# Patient Record
Sex: FEMALE | Race: OTHER | NOT HISPANIC OR LATINO
[De-identification: names, ages, dates, MRNs, and addresses within clinical notes are randomized per-mention and may not be internally consistent; named-entity substitution may affect disease eponyms.]

---

## 2024-04-01 ENCOUNTER — LABORATORY RESULT (OUTPATIENT)
Age: 74
End: 2024-04-01

## 2024-04-01 ENCOUNTER — TRANSCRIPTION ENCOUNTER (OUTPATIENT)
Age: 74
End: 2024-04-01

## 2024-04-01 ENCOUNTER — APPOINTMENT (OUTPATIENT)
Dept: INTERNAL MEDICINE | Facility: CLINIC | Age: 74
End: 2024-04-01
Payer: MEDICARE

## 2024-04-01 ENCOUNTER — NON-APPOINTMENT (OUTPATIENT)
Age: 74
End: 2024-04-01

## 2024-04-01 VITALS
BODY MASS INDEX: 24.07 KG/M2 | DIASTOLIC BLOOD PRESSURE: 88 MMHG | OXYGEN SATURATION: 96 % | SYSTOLIC BLOOD PRESSURE: 165 MMHG | HEART RATE: 66 BPM | HEIGHT: 64 IN | WEIGHT: 141 LBS | TEMPERATURE: 98.2 F

## 2024-04-01 VITALS — SYSTOLIC BLOOD PRESSURE: 152 MMHG | DIASTOLIC BLOOD PRESSURE: 84 MMHG

## 2024-04-01 DIAGNOSIS — Z00.00 ENCOUNTER FOR GENERAL ADULT MEDICAL EXAMINATION W/OUT ABNORMAL FINDINGS: ICD-10-CM

## 2024-04-01 DIAGNOSIS — E03.9 HYPOTHYROIDISM, UNSPECIFIED: ICD-10-CM

## 2024-04-01 DIAGNOSIS — G62.9 POLYNEUROPATHY, UNSPECIFIED: ICD-10-CM

## 2024-04-01 DIAGNOSIS — F17.210 NICOTINE DEPENDENCE, CIGARETTES, UNCOMPLICATED: ICD-10-CM

## 2024-04-01 PROCEDURE — 36415 COLL VENOUS BLD VENIPUNCTURE: CPT

## 2024-04-01 PROCEDURE — G0439: CPT

## 2024-04-01 PROCEDURE — 93000 ELECTROCARDIOGRAM COMPLETE: CPT | Mod: 59

## 2024-04-01 RX ORDER — LEVOTHYROXINE SODIUM 0.11 MG/1
112 TABLET ORAL
Qty: 180 | Refills: 1 | Status: ACTIVE | COMMUNITY
Start: 1900-01-01 | End: 1900-01-01

## 2024-04-01 RX ORDER — MEMANTINE HYDROCHLORIDE 10 MG/1
10 TABLET, FILM COATED ORAL
Refills: 0 | Status: ACTIVE | COMMUNITY

## 2024-04-01 RX ORDER — LOSARTAN POTASSIUM 50 MG/1
50 TABLET, FILM COATED ORAL
Refills: 0 | Status: ACTIVE | COMMUNITY

## 2024-04-01 NOTE — ASSESSMENT
[FreeTextEntry1] : Annual Wellness Visit -Repeat BP is slightly elevated, patient does state some stress given office visit. Advised to monitor at home.  -Check A1c, lipid panel and Vitamin levels -Numbness of feet: Neuro consult placed and metabolic panel ordered. -Continue with Healthy diet. -Pt declined Mammogram, Colonoscopy and Low dose CT scan (given smoking Hx). -RTO in 6 months.

## 2024-04-01 NOTE — ADDENDUM
[FreeTextEntry1] : I, Aleisha Alonzo, acted as a scribe on behalf of Dr. Raman Enrique MD, on 04/01/2024.   All medical entries made by the scribe were at my, Dr. Raman Enrique MD, direction and personally dictated by me on 04/01/2024. I have reviewed the chart and agree that the record accurately reflects my personal performance of the history, physical exam, assessment and plan. I have also personally directed, reviewed, and agreed with the chart.

## 2024-04-01 NOTE — HISTORY OF PRESENT ILLNESS
[FreeTextEntry1] : Patient presents to [facility] and Medicare Annual Wellness Visit. [de-identified] : A 72 y/o F pt presents to office with Hx of Hypothyroidism. Pt is doing well overall and has not gotten sick for the past year.  Pt c/o numbness of feet. Denies LE edema, weakness, CP, Chest tightness, SOB. Reports weight has been stable at 140s. Denies any abdominal pain, urinary symptom or change in bowel habits.  Denies any anxiety, depression or insomnia.  Pt is a current smoker.

## 2024-04-01 NOTE — PHYSICAL EXAM
[No Acute Distress] : no acute distress [Well Nourished] : well nourished [Well Developed] : well developed [Normal Sclera/Conjunctiva] : normal sclera/conjunctiva [Well-Appearing] : well-appearing [PERRL] : pupils equal round and reactive to light [EOMI] : extraocular movements intact [Normal Outer Ear/Nose] : the outer ears and nose were normal in appearance [Normal Oropharynx] : the oropharynx was normal [No Lymphadenopathy] : no lymphadenopathy [No JVD] : no jugular venous distention [Thyroid Normal, No Nodules] : the thyroid was normal and there were no nodules present [Supple] : supple [No Respiratory Distress] : no respiratory distress  [No Accessory Muscle Use] : no accessory muscle use [Clear to Auscultation] : lungs were clear to auscultation bilaterally [Normal Rate] : normal rate  [Normal S1, S2] : normal S1 and S2 [Regular Rhythm] : with a regular rhythm [No Murmur] : no murmur heard [No Carotid Bruits] : no carotid bruits [No Abdominal Bruit] : a ~M bruit was not heard ~T in the abdomen [No Varicosities] : no varicosities [No Edema] : there was no peripheral edema [Pedal Pulses Present] : the pedal pulses are present [No Palpable Aorta] : no palpable aorta [No Extremity Clubbing/Cyanosis] : no extremity clubbing/cyanosis [Non Tender] : non-tender [Soft] : abdomen soft [No Masses] : no abdominal mass palpated [Non-distended] : non-distended [Normal Bowel Sounds] : normal bowel sounds [No HSM] : no HSM [Normal Anterior Cervical Nodes] : no anterior cervical lymphadenopathy [Normal Posterior Cervical Nodes] : no posterior cervical lymphadenopathy [No Spinal Tenderness] : no spinal tenderness [No CVA Tenderness] : no CVA  tenderness [Grossly Normal Strength/Tone] : grossly normal strength/tone [No Joint Swelling] : no joint swelling [Coordination Grossly Intact] : coordination grossly intact [No Rash] : no rash [No Focal Deficits] : no focal deficits [Normal Gait] : normal gait [Deep Tendon Reflexes (DTR)] : deep tendon reflexes were 2+ and symmetric [Normal Affect] : the affect was normal [Normal Insight/Judgement] : insight and judgment were intact

## 2024-04-01 NOTE — HEALTH RISK ASSESSMENT
[Good] : ~his/her~  mood as  good [Yes] : Yes [No] : In the past 12 months have you used drugs other than those required for medical reasons? No [No falls in past year] : Patient reported no falls in the past year [Patient declined mammogram] : Patient declined mammogram [Patient declined colonoscopy] : Patient declined colonoscopy [Fully functional (bathing, dressing, toileting, transferring, walking, feeding)] : Fully functional (bathing, dressing, toileting, transferring, walking, feeding) [Feels Safe at Home] : Feels safe at home [Fully functional (using the telephone, shopping, preparing meals, housekeeping, doing laundry, using] : Fully functional and needs no help or supervision to perform IADLs (using the telephone, shopping, preparing meals, housekeeping, doing laundry, using transportation, managing medications and managing finances) [Reports normal functional visual acuity (ie: able to read med bottle)] : Reports normal functional visual acuity [Smoke Detector] : smoke detector [Carbon Monoxide Detector] : carbon monoxide detector [Seat Belt] :  uses seat belt [Safety elements used in home] : safety elements used in home [Sunscreen] : uses sunscreen [Current] : Current [20 or more] : 20 or more [FreeTextEntry1] : Health Maintenance [Change in mental status noted] : No change in mental status noted [Language] : denies difficulty with language [Learning/Retaining New Information] : denies difficulty learning/retaining new information [Behavior] : denies difficulty with behavior [Handling Complex Tasks] : denies difficulty handling complex tasks [Spatial Ability and Orientation] : denies difficulty with spatial ability and orientation [Reasoning] : denies difficulty with reasoning [Reports changes in hearing] : Reports no changes in hearing [Reports changes in vision] : Reports no changes in vision [Reports changes in dental health] : Reports no changes in dental health [Guns at Home] : no guns at home [Travel to Developing Areas] : does not  travel to developing areas [TB Exposure] : is not being exposed to tuberculosis

## 2024-04-08 ENCOUNTER — NON-APPOINTMENT (OUTPATIENT)
Age: 74
End: 2024-04-08

## 2024-04-08 LAB
25(OH)D3 SERPL-MCNC: 62.3 NG/ML
ALBUMIN MFR SERPL ELPH: 57.6 %
ALBUMIN SERPL ELPH-MCNC: 4.5 G/DL
ALBUMIN SERPL-MCNC: 4.1 G/DL
ALBUMIN/GLOB SERPL: 1.4 RATIO
ALBUPE: 25.6 %
ALP BLD-CCNC: 93 U/L
ALPHA1 GLOB MFR SERPL ELPH: 4.7 %
ALPHA1 GLOB SERPL ELPH-MCNC: 0.3 G/DL
ALPHA1UPE: 21.6 %
ALPHA2 GLOB MFR SERPL ELPH: 10.5 %
ALPHA2 GLOB SERPL ELPH-MCNC: 0.7 G/DL
ALPHA2UPE: 16.7 %
ALT SERPL-CCNC: 11 U/L
ANION GAP SERPL CALC-SCNC: 11 MMOL/L
APPEARANCE: CLEAR
AST SERPL-CCNC: 13 U/L
B-GLOBULIN MFR SERPL ELPH: 11.5 %
B-GLOBULIN SERPL ELPH-MCNC: 0.8 G/DL
BASOPHILS # BLD AUTO: 0.08 K/UL
BASOPHILS NFR BLD AUTO: 1 %
BETAUPE: 14 %
BILIRUB SERPL-MCNC: 0.4 MG/DL
BILIRUBIN URINE: NEGATIVE
BLOOD URINE: NEGATIVE
BUN SERPL-MCNC: 10 MG/DL
CALCIUM SERPL-MCNC: 9.9 MG/DL
CHLORIDE SERPL-SCNC: 105 MMOL/L
CHOLEST SERPL-MCNC: 181 MG/DL
CO2 SERPL-SCNC: 28 MMOL/L
COLOR: YELLOW
CREAT SERPL-MCNC: 0.65 MG/DL
EGFR: 92 ML/MIN/1.73M2
EOSINOPHIL # BLD AUTO: 0.1 K/UL
EOSINOPHIL NFR BLD AUTO: 1.2 %
ESTIMATED AVERAGE GLUCOSE: 105 MG/DL
FOLATE SERPL-MCNC: 5.6 NG/ML
GAMMA GLOB FLD ELPH-MCNC: 1.1 G/DL
GAMMA GLOB MFR SERPL ELPH: 15.7 %
GAMMAUPE: 22.1 %
GLUCOSE QUALITATIVE U: NEGATIVE MG/DL
GLUCOSE SERPL-MCNC: 96 MG/DL
HBA1C MFR BLD HPLC: 5.3 %
HCT VFR BLD CALC: 44.3 %
HDLC SERPL-MCNC: 44 MG/DL
HGB BLD-MCNC: 14.3 G/DL
IGA 24H UR QL IFE: NORMAL
IMM GRANULOCYTES NFR BLD AUTO: 0.4 %
INTERPRETATION SERPL IEP-IMP: NORMAL
KAPPA LC 24H UR QL: NORMAL
KETONES URINE: NEGATIVE MG/DL
LDLC SERPL CALC-MCNC: 112 MG/DL
LEUKOCYTE ESTERASE URINE: ABNORMAL
LYMPHOCYTES # BLD AUTO: 2.21 K/UL
LYMPHOCYTES NFR BLD AUTO: 27.5 %
M PROTEIN SPEC IFE-MCNC: NORMAL
MAN DIFF?: NORMAL
MCHC RBC-ENTMCNC: 29 PG
MCHC RBC-ENTMCNC: 32.3 GM/DL
MCV RBC AUTO: 89.9 FL
MONOCYTES # BLD AUTO: 0.68 K/UL
MONOCYTES NFR BLD AUTO: 8.5 %
NEUTROPHILS # BLD AUTO: 4.93 K/UL
NEUTROPHILS NFR BLD AUTO: 61.4 %
NITRITE URINE: NEGATIVE
NONHDLC SERPL-MCNC: 138 MG/DL
PH URINE: 7
PLATELET # BLD AUTO: 366 K/UL
POTASSIUM SERPL-SCNC: 4.7 MMOL/L
PROT PATTERN 24H UR ELPH-IMP: NORMAL
PROT SERPL-MCNC: 7.1 G/DL
PROT UR-MCNC: 5 MG/DL
PROT UR-MCNC: 5 MG/DL
PROTEIN URINE: NEGATIVE MG/DL
RBC # BLD: 4.93 M/UL
RBC # FLD: 15.1 %
SODIUM SERPL-SCNC: 144 MMOL/L
SPECIFIC GRAVITY URINE: 1.01
TRIGL SERPL-MCNC: 143 MG/DL
TSH SERPL-ACNC: 2.9 UIU/ML
UROBILINOGEN URINE: 0.2 MG/DL
VIT B12 SERPL-MCNC: 1241 PG/ML
VIT B6 SERPL-MCNC: 6.4 UG/L
WBC # FLD AUTO: 8.03 K/UL

## 2024-04-15 ENCOUNTER — NON-APPOINTMENT (OUTPATIENT)
Age: 74
End: 2024-04-15

## 2024-09-09 ENCOUNTER — INPATIENT (INPATIENT)
Facility: HOSPITAL | Age: 74
LOS: 7 days | Discharge: HOME | DRG: 392 | End: 2024-09-17
Attending: HOSPITALIST | Admitting: HOSPITALIST
Payer: MEDICARE

## 2024-09-09 ENCOUNTER — APPOINTMENT (OUTPATIENT)
Dept: INTERNAL MEDICINE | Facility: CLINIC | Age: 74
End: 2024-09-09
Payer: MEDICARE

## 2024-09-09 VITALS
DIASTOLIC BLOOD PRESSURE: 81 MMHG | OXYGEN SATURATION: 94 % | HEART RATE: 69 BPM | TEMPERATURE: 97.4 F | WEIGHT: 139 LBS | BODY MASS INDEX: 23.73 KG/M2 | SYSTOLIC BLOOD PRESSURE: 131 MMHG | HEIGHT: 64 IN

## 2024-09-09 VITALS
SYSTOLIC BLOOD PRESSURE: 127 MMHG | OXYGEN SATURATION: 96 % | RESPIRATION RATE: 20 BRPM | WEIGHT: 139.99 LBS | TEMPERATURE: 98 F | DIASTOLIC BLOOD PRESSURE: 66 MMHG | HEART RATE: 84 BPM | HEIGHT: 64 IN

## 2024-09-09 DIAGNOSIS — R10.9 UNSPECIFIED ABDOMINAL PAIN: ICD-10-CM

## 2024-09-09 LAB
ADD ON TEST-SPECIMEN IN LAB: SIGNIFICANT CHANGE UP
ALBUMIN SERPL ELPH-MCNC: 4.1 G/DL — SIGNIFICANT CHANGE UP (ref 3.3–5)
ALP SERPL-CCNC: 210 U/L — HIGH (ref 40–120)
ALT FLD-CCNC: 218 U/L — HIGH (ref 10–45)
ANION GAP SERPL CALC-SCNC: 13 MMOL/L — SIGNIFICANT CHANGE UP (ref 5–17)
APTT BLD: 30.2 SEC — SIGNIFICANT CHANGE UP (ref 24.5–35.6)
AST SERPL-CCNC: 47 U/L — HIGH (ref 10–40)
BASOPHILS # BLD AUTO: 0 K/UL — SIGNIFICANT CHANGE UP (ref 0–0.2)
BASOPHILS NFR BLD AUTO: 0 % — SIGNIFICANT CHANGE UP (ref 0–2)
BILIRUB SERPL-MCNC: 0.6 MG/DL — SIGNIFICANT CHANGE UP (ref 0.2–1.2)
BUN SERPL-MCNC: 11 MG/DL — SIGNIFICANT CHANGE UP (ref 7–23)
CALCIUM SERPL-MCNC: 9.8 MG/DL — SIGNIFICANT CHANGE UP (ref 8.4–10.5)
CHLORIDE SERPL-SCNC: 99 MMOL/L — SIGNIFICANT CHANGE UP (ref 96–108)
CO2 SERPL-SCNC: 24 MMOL/L — SIGNIFICANT CHANGE UP (ref 22–31)
CREAT SERPL-MCNC: 0.61 MG/DL — SIGNIFICANT CHANGE UP (ref 0.5–1.3)
EGFR: 93 ML/MIN/1.73M2 — SIGNIFICANT CHANGE UP
EOSINOPHIL # BLD AUTO: 0.18 K/UL — SIGNIFICANT CHANGE UP (ref 0–0.5)
EOSINOPHIL NFR BLD AUTO: 1.7 % — SIGNIFICANT CHANGE UP (ref 0–6)
GAS PNL BLDV: SIGNIFICANT CHANGE UP
GLUCOSE SERPL-MCNC: 106 MG/DL — HIGH (ref 70–99)
HCT VFR BLD CALC: 38.3 % — SIGNIFICANT CHANGE UP (ref 34.5–45)
HGB BLD-MCNC: 13.1 G/DL — SIGNIFICANT CHANGE UP (ref 11.5–15.5)
INR BLD: 0.98 RATIO — SIGNIFICANT CHANGE UP (ref 0.85–1.18)
LIDOCAIN IGE QN: 186 U/L — HIGH (ref 7–60)
LYMPHOCYTES # BLD AUTO: 1.73 K/UL — SIGNIFICANT CHANGE UP (ref 1–3.3)
LYMPHOCYTES # BLD AUTO: 16.4 % — SIGNIFICANT CHANGE UP (ref 13–44)
MANUAL SMEAR VERIFICATION: SIGNIFICANT CHANGE UP
MCHC RBC-ENTMCNC: 29 PG — SIGNIFICANT CHANGE UP (ref 27–34)
MCHC RBC-ENTMCNC: 34.2 GM/DL — SIGNIFICANT CHANGE UP (ref 32–36)
MCV RBC AUTO: 84.9 FL — SIGNIFICANT CHANGE UP (ref 80–100)
MONOCYTES # BLD AUTO: 1 K/UL — HIGH (ref 0–0.9)
MONOCYTES NFR BLD AUTO: 9.5 % — SIGNIFICANT CHANGE UP (ref 2–14)
NEUTROPHILS # BLD AUTO: 7.63 K/UL — HIGH (ref 1.8–7.4)
NEUTROPHILS NFR BLD AUTO: 72.4 % — SIGNIFICANT CHANGE UP (ref 43–77)
PLAT MORPH BLD: ABNORMAL
PLATELET # BLD AUTO: 283 K/UL — SIGNIFICANT CHANGE UP (ref 150–400)
POTASSIUM SERPL-MCNC: 3.3 MMOL/L — LOW (ref 3.5–5.3)
POTASSIUM SERPL-SCNC: 3.3 MMOL/L — LOW (ref 3.5–5.3)
PROT SERPL-MCNC: 7.8 G/DL — SIGNIFICANT CHANGE UP (ref 6–8.3)
PROTHROM AB SERPL-ACNC: 10.8 SEC — SIGNIFICANT CHANGE UP (ref 9.5–13)
RBC # BLD: 4.51 M/UL — SIGNIFICANT CHANGE UP (ref 3.8–5.2)
RBC # FLD: 14.6 % — HIGH (ref 10.3–14.5)
RBC BLD AUTO: SIGNIFICANT CHANGE UP
SODIUM SERPL-SCNC: 136 MMOL/L — SIGNIFICANT CHANGE UP (ref 135–145)
WBC # BLD: 10.54 K/UL — HIGH (ref 3.8–10.5)
WBC # FLD AUTO: 10.54 K/UL — HIGH (ref 3.8–10.5)

## 2024-09-09 PROCEDURE — 99215 OFFICE O/P EST HI 40 MIN: CPT

## 2024-09-09 PROCEDURE — G2211 COMPLEX E/M VISIT ADD ON: CPT

## 2024-09-09 PROCEDURE — 99285 EMERGENCY DEPT VISIT HI MDM: CPT

## 2024-09-09 PROCEDURE — 74177 CT ABD & PELVIS W/CONTRAST: CPT | Mod: 26,MC

## 2024-09-09 PROCEDURE — 76700 US EXAM ABDOM COMPLETE: CPT | Mod: 26

## 2024-09-09 RX ORDER — ACETAMINOPHEN 325 MG/1
1000 TABLET ORAL ONCE
Refills: 0 | Status: COMPLETED | OUTPATIENT
Start: 2024-09-09 | End: 2024-09-09

## 2024-09-09 RX ORDER — ONDANSETRON 2 MG/ML
4 INJECTION, SOLUTION INTRAMUSCULAR; INTRAVENOUS ONCE
Refills: 0 | Status: COMPLETED | OUTPATIENT
Start: 2024-09-09 | End: 2024-09-09

## 2024-09-09 RX ORDER — SODIUM CHLORIDE 9 MG/ML
1000 INJECTION INTRAMUSCULAR; INTRAVENOUS; SUBCUTANEOUS ONCE
Refills: 0 | Status: COMPLETED | OUTPATIENT
Start: 2024-09-09 | End: 2024-09-09

## 2024-09-09 RX ADMIN — ACETAMINOPHEN 400 MILLIGRAM(S): 325 TABLET ORAL at 22:51

## 2024-09-09 RX ADMIN — ONDANSETRON 4 MILLIGRAM(S): 2 INJECTION, SOLUTION INTRAMUSCULAR; INTRAVENOUS at 22:52

## 2024-09-09 RX ADMIN — SODIUM CHLORIDE 1000 MILLILITER(S): 9 INJECTION INTRAMUSCULAR; INTRAVENOUS; SUBCUTANEOUS at 22:50

## 2024-09-09 NOTE — ED ADULT NURSE NOTE - NSFALLUNIVINTERV_ED_ALL_ED
Bed/Stretcher in lowest position, wheels locked, appropriate side rails in place/Call bell, personal items and telephone in reach/Instruct patient to call for assistance before getting out of bed/chair/stretcher/Non-slip footwear applied when patient is off stretcher/[place] to call system/Physically safe environment - no spills, clutter or unnecessary equipment/Purposeful proactive rounding/Room/bathroom lighting operational, light cord in reach

## 2024-09-09 NOTE — ED PROVIDER NOTE - RAPID ASSESSMENT
73 y/o hypothyroid remote hx umbilical hernia presents to the ED for evaluation of upper abdominal pain and fever. patient indicates 3 nights ago had upper abd pain and nausea after eating ice cream. following day pain was improved but she ate almost nothing, 2 days ago developed fever. no change in bowel habits. no urinary complaints. on exam, comfortable appearing but brief abd exam in a seated position reveals epigastric and ruq pain

## 2024-09-09 NOTE — ED PROVIDER NOTE - ATTENDING CONTRIBUTION TO CARE
74-year-old female with past medical history of hypothyroidism, presents to the emergency department for 3 days of epigastric pain with associated nausea.  Reports pain increases with food and has had decreased p.o. intake due to this.  Admits to fever 2 days ago but none recently. Denies fever, chills, chest pain, shortness of breath, v/d, numbness, weakness, tingling, headache.     Physical Exam:  Gen: NAD, awake and alert, non-toxic appearing  HEENT: Atraumatic, oropharynx clear, moist mucous membranes, normal conjunctiva  Cardio: RRR, no murmurs, rubs or gallops  Lung: CTAB, no respiratory distress, no wheezes/rhonchi/rales B/L  Abd: soft, Epigastric and right upper quadrant tenderness to palpation without rebound or guarding.  Negative Krause sign.  MSK: no visible deformities, ROMx4   Neuro: No focal sensory or motor deficits  Skin: Warm, well perfused, no rash, no leg swelling     Patient presents with epigastric pain concerning for gastritis versus PUD versus pancreatitis versus cholecystitis.  Will obtain CBC, CMP, ultrasound, lipase.  Labs reviewed, lipase elevated and ultrasound revealing cholelithiasis consistent with gallstone pancreatitis. Will give IV fluids, analgesia and have patient admitted for further workup.

## 2024-09-09 NOTE — ED ADULT NURSE REASSESSMENT NOTE - NS ED NURSE REASSESS COMMENT FT1
Report received from ANISA Dixon. Pt received A&Ox4, vitals retaken and documented. Bed locked and in lowest position, side rails raised, call bell within reach. Currently pending CT. Pt taken to CT scan

## 2024-09-09 NOTE — ED ADULT NURSE NOTE - OBJECTIVE STATEMENT
75 y/o female came to the ED with complaints of RUQ abdominal pains and nausea, increasing with PO intake x 3 days. Denies V, D, dysuria, chills, CP, SOB, flank pains.

## 2024-09-10 DIAGNOSIS — Z90.49 ACQUIRED ABSENCE OF OTHER SPECIFIED PARTS OF DIGESTIVE TRACT: Chronic | ICD-10-CM

## 2024-09-10 DIAGNOSIS — E87.6 HYPOKALEMIA: ICD-10-CM

## 2024-09-10 DIAGNOSIS — Z29.9 ENCOUNTER FOR PROPHYLACTIC MEASURES, UNSPECIFIED: ICD-10-CM

## 2024-09-10 DIAGNOSIS — Z87.898 PERSONAL HISTORY OF OTHER SPECIFIED CONDITIONS: ICD-10-CM

## 2024-09-10 DIAGNOSIS — Z98.890 OTHER SPECIFIED POSTPROCEDURAL STATES: Chronic | ICD-10-CM

## 2024-09-10 DIAGNOSIS — I10 ESSENTIAL (PRIMARY) HYPERTENSION: ICD-10-CM

## 2024-09-10 DIAGNOSIS — K85.90 ACUTE PANCREATITIS WITHOUT NECROSIS OR INFECTION, UNSPECIFIED: ICD-10-CM

## 2024-09-10 DIAGNOSIS — F17.200 NICOTINE DEPENDENCE, UNSPECIFIED, UNCOMPLICATED: ICD-10-CM

## 2024-09-10 DIAGNOSIS — E03.9 HYPOTHYROIDISM, UNSPECIFIED: ICD-10-CM

## 2024-09-10 DIAGNOSIS — R74.8 ABNORMAL LEVELS OF OTHER SERUM ENZYMES: ICD-10-CM

## 2024-09-10 PROBLEM — R10.9 ABDOMINAL PAIN: Status: ACTIVE | Noted: 2024-09-10

## 2024-09-10 LAB
A1C WITH ESTIMATED AVERAGE GLUCOSE RESULT: 5.2 % — SIGNIFICANT CHANGE UP (ref 4–5.6)
ADD ON TEST-SPECIMEN IN LAB: SIGNIFICANT CHANGE UP
ADD ON TEST-SPECIMEN IN LAB: SIGNIFICANT CHANGE UP
ALBUMIN SERPL ELPH-MCNC: 3.3 G/DL — SIGNIFICANT CHANGE UP (ref 3.3–5)
ALP SERPL-CCNC: 163 U/L — HIGH (ref 40–120)
ALT FLD-CCNC: 151 U/L — HIGH (ref 10–45)
ANION GAP SERPL CALC-SCNC: 12 MMOL/L — SIGNIFICANT CHANGE UP (ref 5–17)
AST SERPL-CCNC: 28 U/L — SIGNIFICANT CHANGE UP (ref 10–40)
BILIRUB SERPL-MCNC: 0.6 MG/DL — SIGNIFICANT CHANGE UP (ref 0.2–1.2)
BUN SERPL-MCNC: 7 MG/DL — SIGNIFICANT CHANGE UP (ref 7–23)
CALCIUM SERPL-MCNC: 8.8 MG/DL — SIGNIFICANT CHANGE UP (ref 8.4–10.5)
CHLORIDE SERPL-SCNC: 106 MMOL/L — SIGNIFICANT CHANGE UP (ref 96–108)
CHOLEST SERPL-MCNC: 160 MG/DL — SIGNIFICANT CHANGE UP
CO2 SERPL-SCNC: 24 MMOL/L — SIGNIFICANT CHANGE UP (ref 22–31)
CREAT SERPL-MCNC: 0.55 MG/DL — SIGNIFICANT CHANGE UP (ref 0.5–1.3)
EGFR: 96 ML/MIN/1.73M2 — SIGNIFICANT CHANGE UP
ESTIMATED AVERAGE GLUCOSE: 103 MG/DL — SIGNIFICANT CHANGE UP (ref 68–114)
ETHANOL SERPL-MCNC: <10 MG/DL — SIGNIFICANT CHANGE UP (ref 0–10)
GLUCOSE SERPL-MCNC: 150 MG/DL — HIGH (ref 70–99)
HCT VFR BLD CALC: 34.6 % — SIGNIFICANT CHANGE UP (ref 34.5–45)
HDLC SERPL-MCNC: 24 MG/DL — LOW
HGB BLD-MCNC: 11.4 G/DL — LOW (ref 11.5–15.5)
LIDOCAIN IGE QN: 123 U/L — HIGH (ref 7–60)
LIPID PNL WITH DIRECT LDL SERPL: 111 MG/DL — HIGH
MAGNESIUM SERPL-MCNC: 2 MG/DL — SIGNIFICANT CHANGE UP (ref 1.6–2.6)
MCHC RBC-ENTMCNC: 27.8 PG — SIGNIFICANT CHANGE UP (ref 27–34)
MCHC RBC-ENTMCNC: 32.9 GM/DL — SIGNIFICANT CHANGE UP (ref 32–36)
MCV RBC AUTO: 84.4 FL — SIGNIFICANT CHANGE UP (ref 80–100)
NON HDL CHOLESTEROL: 136 MG/DL — HIGH
NRBC # BLD: 0 /100 WBCS — SIGNIFICANT CHANGE UP (ref 0–0)
PLATELET # BLD AUTO: 238 K/UL — SIGNIFICANT CHANGE UP (ref 150–400)
POTASSIUM SERPL-MCNC: 3.3 MMOL/L — LOW (ref 3.5–5.3)
POTASSIUM SERPL-SCNC: 3.3 MMOL/L — LOW (ref 3.5–5.3)
PROT SERPL-MCNC: 6.5 G/DL — SIGNIFICANT CHANGE UP (ref 6–8.3)
RBC # BLD: 4.1 M/UL — SIGNIFICANT CHANGE UP (ref 3.8–5.2)
RBC # FLD: 14.6 % — HIGH (ref 10.3–14.5)
SODIUM SERPL-SCNC: 142 MMOL/L — SIGNIFICANT CHANGE UP (ref 135–145)
TRIGL SERPL-MCNC: 136 MG/DL — SIGNIFICANT CHANGE UP
TROPONIN T, HIGH SENSITIVITY RESULT: 7 NG/L — SIGNIFICANT CHANGE UP (ref 0–51)
TSH SERPL-MCNC: 9.72 UIU/ML — HIGH (ref 0.27–4.2)
WBC # BLD: 7.89 K/UL — SIGNIFICANT CHANGE UP (ref 3.8–10.5)
WBC # FLD AUTO: 7.89 K/UL — SIGNIFICANT CHANGE UP (ref 3.8–10.5)

## 2024-09-10 PROCEDURE — 99233 SBSQ HOSP IP/OBS HIGH 50: CPT

## 2024-09-10 PROCEDURE — 99223 1ST HOSP IP/OBS HIGH 75: CPT | Mod: 25

## 2024-09-10 PROCEDURE — 74183 MRI ABD W/O CNTR FLWD CNTR: CPT | Mod: 26

## 2024-09-10 PROCEDURE — 99406 BEHAV CHNG SMOKING 3-10 MIN: CPT

## 2024-09-10 RX ORDER — FLU VACCINE TS 2012-2013(5YR+) 45MCG/.5ML
0.5 VIAL (ML) INTRAMUSCULAR ONCE
Refills: 0 | Status: DISCONTINUED | OUTPATIENT
Start: 2024-09-10 | End: 2024-09-17

## 2024-09-10 RX ORDER — DEXTROSE, SODIUM ACETATE, POTASSIUM CHLORIDE, POTASSIUM PHOSPHATE, AND SODIUM CHLORIDE 5; .15; .13; .28; .091 G/100ML; G/100ML; G/100ML; G/100ML; G/100ML
1000 INJECTION, SOLUTION INTRAVENOUS
Refills: 0 | Status: COMPLETED | OUTPATIENT
Start: 2024-09-10 | End: 2024-09-10

## 2024-09-10 RX ORDER — MEMANTINE 7 MG/1
5 CAPSULE, EXTENDED RELEASE ORAL
Refills: 0 | Status: DISCONTINUED | OUTPATIENT
Start: 2024-09-10 | End: 2024-09-17

## 2024-09-10 RX ORDER — ONDANSETRON 2 MG/ML
4 INJECTION, SOLUTION INTRAMUSCULAR; INTRAVENOUS EVERY 8 HOURS
Refills: 0 | Status: DISCONTINUED | OUTPATIENT
Start: 2024-09-10 | End: 2024-09-17

## 2024-09-10 RX ORDER — ACETAMINOPHEN 325 MG/1
650 TABLET ORAL EVERY 6 HOURS
Refills: 0 | Status: DISCONTINUED | OUTPATIENT
Start: 2024-09-10 | End: 2024-09-17

## 2024-09-10 RX ORDER — LEVOTHYROXINE SODIUM 100 MCG
112 TABLET ORAL DAILY
Refills: 0 | Status: DISCONTINUED | OUTPATIENT
Start: 2024-09-10 | End: 2024-09-17

## 2024-09-10 RX ORDER — FOLIC ACID 1 MG
1 TABLET ORAL DAILY
Refills: 0 | Status: DISCONTINUED | OUTPATIENT
Start: 2024-09-10 | End: 2024-09-17

## 2024-09-10 RX ORDER — MAGNESIUM, ALUMINUM HYDROXIDE 200-225/5
30 SUSPENSION, ORAL (FINAL DOSE FORM) ORAL EVERY 4 HOURS
Refills: 0 | Status: DISCONTINUED | OUTPATIENT
Start: 2024-09-10 | End: 2024-09-17

## 2024-09-10 RX ORDER — POTASSIUM CHLORIDE 10 MEQ
40 TABLET, EXT RELEASE, PARTICLES/CRYSTALS ORAL EVERY 4 HOURS
Refills: 0 | Status: COMPLETED | OUTPATIENT
Start: 2024-09-10 | End: 2024-09-10

## 2024-09-10 RX ADMIN — Medication 40 MILLIEQUIVALENT(S): at 18:36

## 2024-09-10 RX ADMIN — DEXTROSE, SODIUM ACETATE, POTASSIUM CHLORIDE, POTASSIUM PHOSPHATE, AND SODIUM CHLORIDE 125 MILLILITER(S): 5; .15; .13; .28; .091 INJECTION, SOLUTION INTRAVENOUS at 06:59

## 2024-09-10 RX ADMIN — MEMANTINE 5 MILLIGRAM(S): 7 CAPSULE, EXTENDED RELEASE ORAL at 18:36

## 2024-09-10 RX ADMIN — Medication 112 MICROGRAM(S): at 06:58

## 2024-09-10 RX ADMIN — Medication 40 MILLIEQUIVALENT(S): at 12:18

## 2024-09-10 RX ADMIN — Medication 1 MILLIGRAM(S): at 12:18

## 2024-09-10 NOTE — H&P ADULT - PROBLEM SELECTOR PLAN 1
lipase >180 , symptoms suggestive of pancreatitis ,  however pancreas wnl limits on imaging , possible passed stone iso liver enzyme elevation , currently afebrile , normotensive   - continue IV hydration   - advance diet as tolerated   - pain control as needed , Tylenol PRN , if severe , can add IV morphine   - IF symptoms persist , GI consult to be arranged by day team   - lipid profile  - f/u repeat trop

## 2024-09-10 NOTE — PHYSICAL EXAM
[Normal] : soft, non-tender, non-distended, no masses palpated, no HSM and normal bowel sounds [de-identified] : Crackles right lower lung [de-identified] : Right upper quadrant pain epigastric pain, Krause sign positive

## 2024-09-10 NOTE — DIETITIAN INITIAL EVALUATION ADULT - ORAL INTAKE PTA/DIET HISTORY
Patient reported tolerating clear liquid diet well. Patient visited at beside, her daughter present, she denies nausea, abdominal   pain, constipation, and diarrhea. Pt reports nausea after eating Ice cream, PTA.

## 2024-09-10 NOTE — PATIENT PROFILE ADULT - FUNCTIONAL ASSESSMENT - DAILY ACTIVITY 5.
Pt kimm requesting refill for   Celecoxib 200 mg   [facility]   Last appt: 12/1/23  Next appt: 6/4/24   4 = No assist / stand by assistance

## 2024-09-10 NOTE — H&P ADULT - PROBLEM SELECTOR PLAN 7
I personally provided 5 minutes of smoking cessation counseling, risk/harm of continued smoking and benefits of quitting discussed Patient declined to use nicotine patch while inpatient

## 2024-09-10 NOTE — ASSESSMENT
[FreeTextEntry1] : Symptoms are consistent with cholecystitis ascending cholangitis differential also includes pancreatic pathology given the duration of symptoms fevers did advise to go to the emergency room, As patient may be septic. Patient to go to [facility].
[FreeTextEntry1] : Symptoms are consistent with cholecystitis ascending cholangitis differential also includes pancreatic pathology given the duration of symptoms fevers did advise to go to the emergency room, As patient may be septic. Patient to go to [facility].
(1) Oriented to own ability

## 2024-09-10 NOTE — H&P ADULT - NSHPPHYSICALEXAM_GEN_ALL_CORE
Vital Signs Last 24 Hrs  T(C): 36.7 (10 Sep 2024 03:05), Max: 36.8 (09 Sep 2024 17:47)  T(F): 98.1 (10 Sep 2024 03:05), Max: 98.3 (09 Sep 2024 17:47)  HR: 58 (10 Sep 2024 03:05) (58 - 84)  BP: 110/60 (10 Sep 2024 03:05) (110/60 - 128/72)  BP(mean): --  RR: 19 (10 Sep 2024 03:05) (19 - 20)  SpO2: 98% (10 Sep 2024 03:05) (96% - 98%)    Parameters below as of 10 Sep 2024 03:05  Patient On (Oxygen Delivery Method): room air Vital Signs Last 24 Hrs  T(C): 36.7 (10 Sep 2024 03:05), Max: 36.8 (09 Sep 2024 17:47)  T(F): 98.1 (10 Sep 2024 03:05), Max: 98.3 (09 Sep 2024 17:47)  HR: 58 (10 Sep 2024 03:05) (58 - 84)  BP: 110/60 (10 Sep 2024 03:05) (110/60 - 128/72)  BP(mean): --  RR: 19 (10 Sep 2024 03:05) (19 - 20)  SpO2: 98% (10 Sep 2024 03:05) (96% - 98%)    Parameters below as of 10 Sep 2024 03:05  Patient On (Oxygen Delivery Method): room air    GENERAL: No acute distress, well-developed  HEAD:  Atraumatic, Normocephalic  ENT: EOMI, PERRLA, conjunctiva and sclera clear,  moist mucosa no pharyngeal erythema or exudates   NECK: supple , no JVD   CHEST/LUNG: Clear to auscultation bilaterally; No wheeze, equal breath sounds bilaterally   BACK: No spinal tenderness,  No CVA tenderness   HEART: Regular rate and rhythm; No murmurs, rubs, or gallops  ABDOMEN: Soft, + tender to palpation in epigastrium + guarding , no rebound ,, Nondistended; Bowel sounds present  EXTREMITIES:  No clubbing, cyanosis, or edema  MSK: No joint swelling or effusions, ROM intact   PSYCH: Normal behavior/affect  NEUROLOGY: AAOx3, non-focal, cranial nerves intact  SKIN: Normal color, No rashes or lesions

## 2024-09-10 NOTE — DIETITIAN INITIAL EVALUATION ADULT - PROBLEM SELECTOR PLAN 2
signed and placed up front for mother to  referral. Mother jordon made aware and verbalized understanding.    cholelithiasis, no cholecystitis   - acute hep panel   - trend liver tests   - MR hepatic protocol

## 2024-09-10 NOTE — PHYSICAL EXAM
[Normal] : soft, non-tender, non-distended, no masses palpated, no HSM and normal bowel sounds [de-identified] : Crackles right lower lung [de-identified] : Right upper quadrant pain epigastric pain, Krause sign positive

## 2024-09-10 NOTE — DIETITIAN INITIAL EVALUATION ADULT - REASON INDICATOR FOR ASSESSMENT
MST>2,Enteral nutrition PTA, 75 y/o hypothyroid remote hx umbilical hernia presents to the ED for evaluation of upper abdominal pain and fever. patient indicates 3 nights ago had upper abd pain and nausea after eating ice cream.

## 2024-09-10 NOTE — H&P ADULT - NSHPLABSRESULTS_GEN_ALL_CORE
Labs personally reviewed:                          13.1   10.54 )-----------( 283      ( 09 Sep 2024 20:37 )             38.3     09-09    136  |  99  |  11  ----------------------------<  106<H>  3.3<L>   |  24  |  0.61    Ca    9.8      09 Sep 2024 20:37    TPro  7.8  /  Alb  4.1  /  TBili  0.6  /  DBili  x   /  AST  47<H>  /  ALT  218<H>  /  AlkPhos  210<H>  09-09        LIVER FUNCTIONS - ( 09 Sep 2024 20:37 )  Alb: 4.1 g/dL / Pro: 7.8 g/dL / ALK PHOS: 210 U/L / ALT: 218 U/L / AST: 47 U/L / GGT: x           PT/INR - ( 09 Sep 2024 20:37 )   PT: 10.8 sec;   INR: 0.98 ratio         PTT - ( 09 Sep 2024 20:37 )  PTT:30.2 sec  Urinalysis Basic - ( 09 Sep 2024 20:37 )    Color: x / Appearance: x / SG: x / pH: x  Gluc: 106 mg/dL / Ketone: x  / Bili: x / Urobili: x   Blood: x / Protein: x / Nitrite: x   Leuk Esterase: x / RBC: x / WBC x   Sq Epi: x / Non Sq Epi: x / Bacteria: x      CAPILLARY BLOOD GLUCOSE          Imaging:  CT AP: No acute pathology in the abdomen or pelvis.    Indeterminate hepatic dome lesion. Nonemergent hepatic protocol MRI is   recommended for further characterization    us ab: Cholelithiasis without evidence of acute cholecystitis.    Mildly increased echogenicity of the liver, suggestive of mild steatosis.      EKG personally reviewed: Labs personally reviewed:                          13.1   10.54 )-----------( 283      ( 09 Sep 2024 20:37 )             38.3     09-09    136  |  99  |  11  ----------------------------<  106<H>  3.3<L>   |  24  |  0.61    Ca    9.8      09 Sep 2024 20:37    TPro  7.8  /  Alb  4.1  /  TBili  0.6  /  DBili  x   /  AST  47<H>  /  ALT  218<H>  /  AlkPhos  210<H>  09-09        LIVER FUNCTIONS - ( 09 Sep 2024 20:37 )  Alb: 4.1 g/dL / Pro: 7.8 g/dL / ALK PHOS: 210 U/L / ALT: 218 U/L / AST: 47 U/L / GGT: x           PT/INR - ( 09 Sep 2024 20:37 )   PT: 10.8 sec;   INR: 0.98 ratio         PTT - ( 09 Sep 2024 20:37 )  PTT:30.2 sec  Urinalysis Basic - ( 09 Sep 2024 20:37 )    Color: x / Appearance: x / SG: x / pH: x  Gluc: 106 mg/dL / Ketone: x  / Bili: x / Urobili: x   Blood: x / Protein: x / Nitrite: x   Leuk Esterase: x / RBC: x / WBC x   Sq Epi: x / Non Sq Epi: x / Bacteria: x      CAPILLARY BLOOD GLUCOSE          Imaging:  CT AP: No acute pathology in the abdomen or pelvis.    Indeterminate hepatic dome lesion. Nonemergent hepatic protocol MRI is   recommended for further characterization    us ab: Cholelithiasis without evidence of acute cholecystitis.    Mildly increased echogenicity of the liver, suggestive of mild steatosis.      EKG

## 2024-09-10 NOTE — H&P ADULT - TIME BILLING
Chart review , case discussion with ED provider , obtain history   examination of patient , answering questions and concerns , ordering labs and medications , and documentation

## 2024-09-10 NOTE — DIETITIAN INITIAL EVALUATION ADULT - PERTINENT LABORATORY DATA
09-10    142  |  106  |  7   ----------------------------<  150<H>  3.3<L>   |  24  |  0.55    Ca    8.8      10 Sep 2024 10:26    TPro  6.5  /  Alb  3.3  /  TBili  0.6  /  DBili  x   /  AST  28  /  ALT  151<H>  /  AlkPhos  163<H>  09-10

## 2024-09-10 NOTE — HISTORY OF PRESENT ILLNESS
[FreeTextEntry8] : Patient is a 74 yr old female present today for an acute visit.  On Friday started having epigastric discomfort, does have chills temperature 39 C feels fatigue decreased appetite.  Abdominal pain has been improving.  Denies any congestion sore throat coughing wheezing diarrhea or vomiting.  Denies any sick contacts foreign travel.  Denies any changes to medications.

## 2024-09-10 NOTE — ADDENDUM
[FreeTextEntry1] : I, Mary Maki, acted as a scribe on behalf of Dr. Raman Enrique MD, on 09/09/2024.   All medical entries made by the scribe were at my, Dr. Raman Enrique MD, direction and personally dictated by me on 09/09/2024. I have reviewed the chart and agree that the record accurately reflects my personal performance of the history, physical exam, assessment and plan. I have also personally directed, reviewed, and agreed with the chart.

## 2024-09-10 NOTE — DIETITIAN INITIAL EVALUATION ADULT - NS FNS DIET ORDER
Diet, Clear Liquid (09-10-24 @ 07:13)  Diet, Regular (09-10-24 @ 06:24)  Diet, Full Liquid (09-10-24 @ 06:24)

## 2024-09-10 NOTE — DIETITIAN INITIAL EVALUATION ADULT - PERTINENT MEDS FT
MEDICATIONS  (STANDING):  folic acid 1 milliGRAM(s) Oral daily  influenza  Vaccine (HIGH DOSE) 0.5 milliLiter(s) IntraMuscular once  levothyroxine 112 MICROGram(s) Oral daily  memantine 5 milliGRAM(s) Oral two times a day  potassium chloride    Tablet ER 40 milliEquivalent(s) Oral every 4 hours    MEDICATIONS  (PRN):  acetaminophen     Tablet .. 650 milliGRAM(s) Oral every 6 hours PRN Temp greater or equal to 38C (100.4F), Mild Pain (1 - 3)  aluminum hydroxide/magnesium hydroxide/simethicone Suspension 30 milliLiter(s) Oral every 4 hours PRN Dyspepsia  melatonin 3 milliGRAM(s) Oral at bedtime PRN Insomnia  ondansetron Injectable 4 milliGRAM(s) IV Push every 8 hours PRN Nausea and/or Vomiting

## 2024-09-10 NOTE — PROGRESS NOTE ADULT - SUBJECTIVE AND OBJECTIVE BOX
Patient is a 74y old  Female who presents with a chief complaint of Abdominal pain     (10 Sep 2024 12:10)      SUBJECTIVE / OVERNIGHT EVENTS: pt feels better, abdominal pain better, no cp, sob     MEDICATIONS  (STANDING):  folic acid 1 milliGRAM(s) Oral daily  influenza  Vaccine (HIGH DOSE) 0.5 milliLiter(s) IntraMuscular once  levothyroxine 112 MICROGram(s) Oral daily  memantine 5 milliGRAM(s) Oral two times a day  potassium chloride    Tablet ER 40 milliEquivalent(s) Oral every 4 hours    MEDICATIONS  (PRN):  acetaminophen     Tablet .. 650 milliGRAM(s) Oral every 6 hours PRN Temp greater or equal to 38C (100.4F), Mild Pain (1 - 3)  aluminum hydroxide/magnesium hydroxide/simethicone Suspension 30 milliLiter(s) Oral every 4 hours PRN Dyspepsia  melatonin 3 milliGRAM(s) Oral at bedtime PRN Insomnia  ondansetron Injectable 4 milliGRAM(s) IV Push every 8 hours PRN Nausea and/or Vomiting        CAPILLARY BLOOD GLUCOSE        I&O's Summary    09 Sep 2024 07:01  -  10 Sep 2024 07:00  --------------------------------------------------------  IN: 120 mL / OUT: 0 mL / NET: 120 mL        PHYSICAL EXAM:  GENERAL: NAD, well-developed  HEAD:  Atraumatic, Normocephalic  EYES: EOMI, PERRLA, conjunctiva and sclera clear  NECK: Supple, No JVD  CHEST/LUNG: Clear to auscultation bilaterally; No wheeze  HEART: Regular rate and rhythm; No murmurs, rubs, or gallops  ABDOMEN: Soft, +epigastric ttp , Nondistended; Bowel sounds present  EXTREMITIES:  2+ Peripheral Pulses, No clubbing, cyanosis, or edema  PSYCH: AAOx3  NEUROLOGY: non-focal  SKIN: No rashes or lesions    LABS:                        11.4   7.89  )-----------( 238      ( 10 Sep 2024 10:26 )             34.6     09-10    142  |  106  |  7   ----------------------------<  150<H>  3.3<L>   |  24  |  0.55    Ca    8.8      10 Sep 2024 10:26  Mg     2.0     09-10    TPro  6.5  /  Alb  3.3  /  TBili  0.6  /  DBili  x   /  AST  28  /  ALT  151<H>  /  AlkPhos  163<H>  09-10    PT/INR - ( 09 Sep 2024 20:37 )   PT: 10.8 sec;   INR: 0.98 ratio         PTT - ( 09 Sep 2024 20:37 )  PTT:30.2 sec      Urinalysis Basic - ( 10 Sep 2024 10:26 )    Color: x / Appearance: x / SG: x / pH: x  Gluc: 150 mg/dL / Ketone: x  / Bili: x / Urobili: x   Blood: x / Protein: x / Nitrite: x   Leuk Esterase: x / RBC: x / WBC x   Sq Epi: x / Non Sq Epi: x / Bacteria: x        RADIOLOGY & ADDITIONAL TESTS:    Imaging Personally Reviewed:    Consultant(s) Notes Reviewed:      Care Discussed with Consultants/Other Providers:

## 2024-09-10 NOTE — SBIRT NOTE ADULT - NSSBIRTALCNOACTINTDET_GEN_A_CORE
NICOLASA consulted via sunrise regarding SBIRT screening. Patient within healthy drinking guidelines. No SBIRT interventions/ resources needed at this time. SW remains available as needed.

## 2024-09-10 NOTE — H&P ADULT - NSHPREVIEWOFSYSTEMS_GEN_ALL_CORE
CONSTITUTIONAL: + weakness, + subjective  fevers no  chills  EYES/ENT: No visual changes;  No dysphagia  NECK: No pain or stiffness  RESPIRATORY: No cough, wheezing, hemoptysis; No shortness of breath  CARDIOVASCULAR: No chest pain or palpitations; No lower extremity edema  EXTREMITIES: no le edema, cyanosis, clubbing  GASTROINTESTINAL: +  abdominal/ epigastric pain. No nausea, vomiting, or hematemesis; No diarrhea or constipation. No melena or hematochezia.  BACK: No back pain  GENITOURINARY: No dysuria, frequency or hematuria  NEUROLOGICAL: No numbness or weakness  MSK: no joint swelling or pain  SKIN: No itching, burning, rashes, or lesions   PSYCH: no agitation  All other review of systems is negative unless indicated above.

## 2024-09-10 NOTE — H&P ADULT - HISTORY OF PRESENT ILLNESS
Patient is a 74  year old female w/pmh hypothyroidism , HTN , presents for abdominal pain over the past few days .   Two days prior to admission, patient reports right upper abdominal pain , sharp in character , radiating to back , worse with food symptoms associated with fever at the time, no diarrhea , no nausea or vomiting.  Since then , pain has mostly resolved , but patient reports poor appetite and decreased oral intake, and generalized weakness. She was evaluated by her PMD and noted to have tenderness in her RUQ and subsequently referred to the hospital.

## 2024-09-11 DIAGNOSIS — K80.50 CALCULUS OF BILE DUCT WITHOUT CHOLANGITIS OR CHOLECYSTITIS WITHOUT OBSTRUCTION: ICD-10-CM

## 2024-09-11 DIAGNOSIS — L02.91 CUTANEOUS ABSCESS, UNSPECIFIED: ICD-10-CM

## 2024-09-11 LAB
AFP-TM SERPL-MCNC: 5.4 NG/ML — SIGNIFICANT CHANGE UP
ALBUMIN SERPL ELPH-MCNC: 3.3 G/DL — SIGNIFICANT CHANGE UP (ref 3.3–5)
ALP SERPL-CCNC: 192 U/L — HIGH (ref 40–120)
ALT FLD-CCNC: 122 U/L — HIGH (ref 10–45)
ANION GAP SERPL CALC-SCNC: 11 MMOL/L — SIGNIFICANT CHANGE UP (ref 5–17)
AST SERPL-CCNC: 27 U/L — SIGNIFICANT CHANGE UP (ref 10–40)
BILIRUB SERPL-MCNC: 0.5 MG/DL — SIGNIFICANT CHANGE UP (ref 0.2–1.2)
BUN SERPL-MCNC: 8 MG/DL — SIGNIFICANT CHANGE UP (ref 7–23)
CALCIUM SERPL-MCNC: 9 MG/DL — SIGNIFICANT CHANGE UP (ref 8.4–10.5)
CANCER AG19-9 SERPL-ACNC: 11 U/ML — SIGNIFICANT CHANGE UP
CEA SERPL-MCNC: 3.8 NG/ML — SIGNIFICANT CHANGE UP (ref 0–3.8)
CHLORIDE SERPL-SCNC: 109 MMOL/L — HIGH (ref 96–108)
CO2 SERPL-SCNC: 22 MMOL/L — SIGNIFICANT CHANGE UP (ref 22–31)
CREAT SERPL-MCNC: 0.52 MG/DL — SIGNIFICANT CHANGE UP (ref 0.5–1.3)
EGFR: 97 ML/MIN/1.73M2 — SIGNIFICANT CHANGE UP
GLUCOSE SERPL-MCNC: 102 MG/DL — HIGH (ref 70–99)
HAV IGM SER-ACNC: SIGNIFICANT CHANGE UP
HBV CORE IGM SER-ACNC: SIGNIFICANT CHANGE UP
HBV SURFACE AG SER-ACNC: SIGNIFICANT CHANGE UP
HCV AB S/CO SERPL IA: 0.11 S/CO — SIGNIFICANT CHANGE UP (ref 0–0.99)
HCV AB SERPL-IMP: SIGNIFICANT CHANGE UP
IGA FLD-MCNC: 177 MG/DL — SIGNIFICANT CHANGE UP (ref 84–499)
IGG FLD-MCNC: 779 MG/DL — SIGNIFICANT CHANGE UP (ref 610–1660)
IGG4 SER-MCNC: 9.1 MG/DL — SIGNIFICANT CHANGE UP (ref 1–123)
IGM SERPL-MCNC: 202 MG/DL — SIGNIFICANT CHANGE UP (ref 35–242)
KAPPA LC SER QL IFE: 1.86 MG/DL — SIGNIFICANT CHANGE UP (ref 0.33–1.94)
KAPPA/LAMBDA FREE LIGHT CHAIN RATIO, SERUM: 1.29 RATIO — SIGNIFICANT CHANGE UP (ref 0.26–1.65)
LAMBDA LC SER QL IFE: 1.44 MG/DL — SIGNIFICANT CHANGE UP (ref 0.57–2.63)
POTASSIUM SERPL-MCNC: 4.2 MMOL/L — SIGNIFICANT CHANGE UP (ref 3.5–5.3)
POTASSIUM SERPL-SCNC: 4.2 MMOL/L — SIGNIFICANT CHANGE UP (ref 3.5–5.3)
PROT SERPL-MCNC: 6.4 G/DL — SIGNIFICANT CHANGE UP (ref 6–8.3)
SODIUM SERPL-SCNC: 142 MMOL/L — SIGNIFICANT CHANGE UP (ref 135–145)
T4 FREE SERPL-MCNC: 1.1 NG/DL — SIGNIFICANT CHANGE UP (ref 0.9–1.8)

## 2024-09-11 PROCEDURE — 99233 SBSQ HOSP IP/OBS HIGH 50: CPT

## 2024-09-11 PROCEDURE — 99232 SBSQ HOSP IP/OBS MODERATE 35: CPT | Mod: GC

## 2024-09-11 PROCEDURE — 99222 1ST HOSP IP/OBS MODERATE 55: CPT | Mod: GC

## 2024-09-11 RX ORDER — PIPERACILLIN SODIUM AND TAZOBACTAM SODIUM 3; .375 G/15ML; G/15ML
3.38 INJECTION, POWDER, FOR SOLUTION INTRAVENOUS ONCE
Refills: 0 | Status: COMPLETED | OUTPATIENT
Start: 2024-09-12 | End: 2024-09-12

## 2024-09-11 RX ORDER — PIPERACILLIN SODIUM AND TAZOBACTAM SODIUM 3; .375 G/15ML; G/15ML
3.38 INJECTION, POWDER, FOR SOLUTION INTRAVENOUS ONCE
Refills: 0 | Status: COMPLETED | OUTPATIENT
Start: 2024-09-11 | End: 2024-09-11

## 2024-09-11 RX ORDER — PIPERACILLIN SODIUM AND TAZOBACTAM SODIUM 3; .375 G/15ML; G/15ML
3.38 INJECTION, POWDER, FOR SOLUTION INTRAVENOUS EVERY 8 HOURS
Refills: 0 | Status: DISCONTINUED | OUTPATIENT
Start: 2024-09-12 | End: 2024-09-17

## 2024-09-11 RX ADMIN — MEMANTINE 5 MILLIGRAM(S): 7 CAPSULE, EXTENDED RELEASE ORAL at 17:49

## 2024-09-11 RX ADMIN — PIPERACILLIN SODIUM AND TAZOBACTAM SODIUM 200 GRAM(S): 3; .375 INJECTION, POWDER, FOR SOLUTION INTRAVENOUS at 13:43

## 2024-09-11 RX ADMIN — PIPERACILLIN SODIUM AND TAZOBACTAM SODIUM 25 GRAM(S): 3; .375 INJECTION, POWDER, FOR SOLUTION INTRAVENOUS at 17:47

## 2024-09-11 RX ADMIN — Medication 112 MICROGRAM(S): at 05:07

## 2024-09-11 RX ADMIN — MEMANTINE 5 MILLIGRAM(S): 7 CAPSULE, EXTENDED RELEASE ORAL at 05:08

## 2024-09-11 RX ADMIN — Medication 1 MILLIGRAM(S): at 13:48

## 2024-09-11 NOTE — CONSULT NOTE ADULT - SUBJECTIVE AND OBJECTIVE BOX
HPI: 75 yo F with hypothyroidism, HTN who presents for abdominal pain over the past few days.     Patient accompanied by daughter at bedside.     Patient reports having an ice cream on Friday and having subsequent abd pain radiating to the back. Pain is much better currently and patient has been tolerating PO diet without issues. Patient has not had previous episodes of pain.     Patient afebrile in hospital although per chart review, may have had subjective fevers at home.       Allergies:  No Known Allergies      Home Medications:    Hospital Medications:  acetaminophen     Tablet .. 650 milliGRAM(s) Oral every 6 hours PRN  aluminum hydroxide/magnesium hydroxide/simethicone Suspension 30 milliLiter(s) Oral every 4 hours PRN  folic acid 1 milliGRAM(s) Oral daily  influenza  Vaccine (HIGH DOSE) 0.5 milliLiter(s) IntraMuscular once  levothyroxine 112 MICROGram(s) Oral daily  melatonin 3 milliGRAM(s) Oral at bedtime PRN  memantine 5 milliGRAM(s) Oral two times a day  ondansetron Injectable 4 milliGRAM(s) IV Push every 8 hours PRN  piperacillin/tazobactam IVPB.- 3.375 Gram(s) IV Intermittent once      PMHX/PSHX:  Hypothyroid    HTN (hypertension)    S/P appendectomy    H/O umbilical hernia repair        Family history:  No pertinent family history in first degree relatives        Denies family history of colon cancer/polyps, stomach cancer/polyps, pancreatic cancer/masses, liver cancer/disease, ovarian cancer and endometrial cancer.    Social History:   Tob: Denies  EtOH: Denies  Illicit Drugs: Denies    ROS:     General:  No wt loss, fevers, chills, night sweats, fatigue  Eyes:  Good vision, no reported pain  ENT:  No sore throat, pain, runny nose, dysphagia  CV:  No pain, palpitations, hypo/hypertension  Pulm:  No dyspnea, cough, tachypnea, wheezing  GI:  see HPI  :  No pain, bleeding, incontinence, nocturia  Muscle:  No pain, weakness  Neuro:  No weakness, tingling, memory problems  Psych:  No fatigue, insomnia, mood problems, depression  Endocrine:  No polyuria, polydipsia, cold/heat intolerance  Heme:  No petechiae, ecchymosis, easy bruisability  Skin:  No rash, tattoos, scars, edema    PHYSICAL EXAM:     GENERAL:  No acute distress  HEENT:  NCAT, no scleral icterus, hard on hearing  CHEST:  no respiratory distress  HEART:  Regular rate and rhythm  ABDOMEN:  Soft, non-tender, non-distended, no masses  EXTREMITIES: No edema  SKIN:  No rash/erythema/ecchymoses/petechiae/wounds/abscess/warm/dry  NEURO:  Alert and oriented x 3     Vital Signs:  Vital Signs Last 24 Hrs  T(C): 36.4 (11 Sep 2024 13:36), Max: 36.9 (11 Sep 2024 05:09)  T(F): 97.6 (11 Sep 2024 13:36), Max: 98.4 (11 Sep 2024 05:09)  HR: 47 (11 Sep 2024 13:36) (47 - 68)  BP: 127/65 (11 Sep 2024 13:36) (127/61 - 148/86)  BP(mean): --  RR: 18 (11 Sep 2024 13:36) (17 - 18)  SpO2: 95% (11 Sep 2024 13:36) (94% - 97%)    Parameters below as of 11 Sep 2024 13:36  Patient On (Oxygen Delivery Method): room air      Daily     Daily     LABS:                        11.4   7.89  )-----------( 238      ( 10 Sep 2024 10:26 )             34.6       09-11    142  |  109<H>  |  8   ----------------------------<  102<H>  4.2   |  22  |  0.52    Ca    9.0      11 Sep 2024 05:59  Mg     2.0     09-10    TPro  6.4  /  Alb  3.3  /  TBili  0.5  /  DBili  x   /  AST  27  /  ALT  122<H>  /  AlkPhos  192<H>  09-11    LIVER FUNCTIONS - ( 11 Sep 2024 05:59 )  Alb: 3.3 g/dL / Pro: 6.4 g/dL / ALK PHOS: 192 U/L / ALT: 122 U/L / AST: 27 U/L / GGT: x           PT/INR - ( 09 Sep 2024 20:37 )   PT: 10.8 sec;   INR: 0.98 ratio         PTT - ( 09 Sep 2024 20:37 )  PTT:30.2 sec  Urinalysis Basic - ( 11 Sep 2024 05:59 )    Color: x / Appearance: x / SG: x / pH: x  Gluc: 102 mg/dL / Ketone: x  / Bili: x / Urobili: x   Blood: x / Protein: x / Nitrite: x   Leuk Esterase: x / RBC: x / WBC x   Sq Epi: x / Non Sq Epi: x / Bacteria: x                              11.4   7.89  )-----------( 238      ( 10 Sep 2024 10:26 )             34.6                         13.1   10.54 )-----------( 283      ( 09 Sep 2024 20:37 )             38.3       Imaging:

## 2024-09-11 NOTE — CONSULT NOTE ADULT - ASSESSMENT
73 yo F with hypothyroidism, HTN who presents for abdominal pain over the past few days found to have pancreatitis and hepatic dome lesion.     #Abdominal pain   Patient had elevated lipase with epigastric pain (no CT findings of pancreatitis) which could be pancreatitis 2/2 GS, alcohol, IgG4. Her liver enzyme elevation is likely reactive. The patient also has questionable CT findings suggestive of early retroperitoneal fibrosis which is associated with IgG4 disease and could cause abdominal pain.     #Hepatic dome lesion with central hypoattenuation   #Hepatic steatosis     Recommendations:  - Please get MRCP/MRI with and without contrast to further classify the hepatic dome lesion and biliary ducts   - F/u IgG4 subset   - F/u AMA, JUSTO, smooth muscle antibody, immunoglobulins   - F/u tumor markers   - F/u acute hepatitis panel   - Continue to trend CMP and CBC     Note not finalized until signed by attending     Aster Quiñones MD  Gastroenterology/Hepatology Fellow, PGY-5  Please contact via TEAMS    NON-URGENT CONSULTS:  Please email [email or web address]  [email or web address]       75 yo F with hypothyroidism, HTN who presents for abdominal pain over the past few days found to have pancreatitis and hepatic dome lesion.     #Abdominal pain   Patient had elevated lipase with epigastric pain (no CT findings of pancreatitis) which could be pancreatitis 2/2 GS, alcohol, IgG4. Her liver enzyme elevation is likely reactive. The patient also has questionable CT findings suggestive of early retroperitoneal fibrosis which is associated with IgG4 disease and could cause abdominal pain.   Update: MRI with stones in ampulla and distal CBD; + MRI findings of cholangitis     #Choledocholithiasis with mild cholangitis     #Hepatic dome lesion with central hypoattenuation   - Questionable infection/ collections on MRI from cholangitis?    #Hepatic steatosis     Recommendations:  - MRCP/MRI findings to be discussed with advanced team for intervention   - Please put on antibiotics for cholangitis  - F/u IgG4 subset   - F/u AMA, JUSTO, smooth muscle antibody, immunoglobulins   - F/u tumor markers   - F/u acute hepatitis panel   - Continue to trend CMP, INR and CBC     Note not finalized until signed by attending     Aster Quiñones MD  Gastroenterology/Hepatology Fellow, PGY-5  Please contact via TEAMS    NON-URGENT CONSULTS:  Please email [email or web address]       75 yo F with hypothyroidism, HTN who presents for abdominal pain over the past few days found to have pancreatitis and hepatic dome lesion.     #Abdominal pain   Patient had elevated lipase with epigastric pain (no CT findings of pancreatitis) which could be pancreatitis 2/2 GS, alcohol, IgG4. Her liver enzyme elevation is likely reactive. The patient also has questionable CT findings suggestive of early retroperitoneal fibrosis which is associated with IgG4 disease and could cause abdominal pain.   Update: MRI with stones in ampulla and distal CBD; + MRI findings of cholangitis     #Choledocholithiasis with mild cholangitis     #Hepatic dome lesion with central hypoattenuation   - Questionable infection/ collections on MRI from cholangitis?    #Hepatic steatosis     Recommendations:  - MRCP/MRI findings to be discussed with advanced team for intervention   - Please put on antibiotics for cholangitis  - F/u IgG4 subset   - F/u AMA, JUSTO, smooth muscle antibody, immunoglobulins   - F/u tumor markers   - F/u acute hepatitis panel   - Continue to trend CMP, INR and CBC      Note not finalized until signed by attending     Aster Quiñones MD  Gastroenterology/Hepatology Fellow, PGY-5  Please contact via TEAMS    NON-URGENT CONSULTS:  Please email [email or web address]

## 2024-09-11 NOTE — CONSULT NOTE ADULT - ASSESSMENT
75 yo F with hypothyroidism, HTN who presents for abdominal pain over the past few days. Advanced GI consulted for choledocholithiasis.     #choledocholithiasis   #cholangitis- Grade I    - MRCP with Ampullary and distal CBD stones. Mild cholangitis with tiny scattered peripheral associated biliary abscesses.    Recommendations:   -trend clinical symptoms, exam findings, vital signs, CBC, CMP, INR  -complete infectious workup and f/u BCx  -per [place] criteria, patient does not meet criteria for emergent ERCP  -keep NPO after midnight   -plan for ERCP tentative 9/12 or 9/13 (patient and daughter aware that procedure may be tomorrow vs Friday)     All recommendations are tentative until note is attested by attending.     Alexa Portillo, PGY-6  Gastroenterology/Hepatology Fellow  Available on [employer] Teams  [phone] ([facility] Short Range Pager)  [phone] ([facility] Long Range Pager)    On Weekends/Holidays (All day) and Weekdays after 5 PM to 8AM:  For non-urgent consults, please email [email or web address]  For emergent consults, please contact on call GI team.  See Amion schedule ([facility]), [employer] paging system ([facility]), or call hospital  ([facility])

## 2024-09-11 NOTE — CONSULT NOTE ADULT - SUBJECTIVE AND OBJECTIVE BOX
Initial GI Consult    Patient is a 74y old  Female who presents with a chief complaint of abdominal pain     HPI: 75 yo F with hypothyroidism, HTN who presents for abdominal pain over the past few days .       PAST MEDICAL & SURGICAL HISTORY:  Hypothyroid      HTN (hypertension)      S/P appendectomy      H/O umbilical hernia repair        FAMILY HISTORY:  No pertinent family history in first degree relatives        MEDS:  MEDICATIONS  (STANDING):  folic acid 1 milliGRAM(s) Oral daily  influenza  Vaccine (HIGH DOSE) 0.5 milliLiter(s) IntraMuscular once  levothyroxine 112 MICROGram(s) Oral daily  memantine 5 milliGRAM(s) Oral two times a day    MEDICATIONS  (PRN):  acetaminophen     Tablet .. 650 milliGRAM(s) Oral every 6 hours PRN Temp greater or equal to 38C (100.4F), Mild Pain (1 - 3)  aluminum hydroxide/magnesium hydroxide/simethicone Suspension 30 milliLiter(s) Oral every 4 hours PRN Dyspepsia  melatonin 3 milliGRAM(s) Oral at bedtime PRN Insomnia  ondansetron Injectable 4 milliGRAM(s) IV Push every 8 hours PRN Nausea and/or Vomiting    Allergies    No Known Allergies    Intolerances        ROS: As per HPI    ______________________________________________________________________  PHYSICAL EXAM:  T(C): 36.9 (09-11-24 @ 05:09), Max: 36.9 (09-11-24 @ 05:09)  HR: 54 (09-11-24 @ 05:09)  BP: 127/61 (09-11-24 @ 05:09)  RR: 17 (09-11-24 @ 05:09)  SpO2: 94% (09-11-24 @ 05:09)  Wt(kg): --    09-10  -  09-11  --------------------------------------------------------  IN:    Oral Fluid: 1081 mL  Total IN: 1081 mL    OUT:    Stool (mL): 0 mL  Total OUT: 0 mL    Total NET: 1081 mL          GEN: NAD, normocephalic  CVS: S1S2+  CHEST: clear to auscultation  ABD: soft , nontender, nondistended, bowel sounds present  EXTR: no cyanosis, no clubbing, no edema  NEURO: A&OX  SKIN:  warm;  non icteric    ______________________________________________________________________  LABS:                        11.4   7.89  )-----------( 238      ( 10 Sep 2024 10:26 )             34.6     09-11    142  |  109<H>  |  8   ----------------------------<  102<H>  4.2   |  22  |  0.52    Ca    9.0      11 Sep 2024 05:59  Mg     2.0     09-10    TPro  6.4  /  Alb  3.3  /  TBili  0.5  /  DBili  x   /  AST  27  /  ALT  122<H>  /  AlkPhos  192<H>  09-11    LIVER FUNCTIONS - ( 11 Sep 2024 05:59 )  Alb: 3.3 g/dL / Pro: 6.4 g/dL / ALK PHOS: 192 U/L / ALT: 122 U/L / AST: 27 U/L / GGT: x           PT/INR - ( 09 Sep 2024 20:37 )   PT: 10.8 sec;   INR: 0.98 ratio         PTT - ( 09 Sep 2024 20:37 )  PTT:30.2 sec  ____________________________________________      ACC: [id number] EXAM:  CT ABDOMEN AND PELVIS IC   ORDERED BY: IBRAHIMA ÁLVAREZ     PROCEDURE DATE:  09/09/2024          INTERPRETATION:  CLINICAL INFORMATION: Epigastric pain    COMPARISON: None.    CONTRAST/COMPLICATIONS:  IV Contrast: Omnipaque 350  90 cc administered   10 cc discarded  Oral Contrast: NONE  Complications: None reported at time of study completion    PROCEDURE:  CT of the Abdomen and Pelvis was performed.  Sagittal and coronal reformats were performed.    FINDINGS:  LOWER CHEST: Bibasilar subsegmental atelectasis. Coronary artery   calcifications.    LIVER: 2 7 cm ill-defined indeterminate hepatic dome lesion (3/13) with   central hypoattenuation.  BILE DUCTS: Normal caliber.  GALLBLADDER: Within normal limits.  SPLEEN:Within normal limits.  PANCREAS: Within normal limits.  ADRENALS: Within normal limits.  KIDNEYS/URETERS: Symmetric renal enhancement. No hydronephrosis.    BLADDER: Within normal limits.  REPRODUCTIVE ORGANS: Unremarkable uterus. Bilateral simple appearing   adnexal cysts measuring 2.0 cm.    BOWEL: No bowel obstruction. Appendix is not visualized. No evidence of   inflammation in the pericecal region.  PERITONEUM/RETROPERITONEUM: No pneumoperitoneum or ascites. Hazy   stranding around the origin of the celiac axis and SMA of uncertain   clinical significance, could reflect early retroperitoneal fibrosis.  VESSELS: Atherosclerotic changes.  LYMPH NODES: No lymphadenopathy.  ABDOMINAL WALL: Within normal limits.  BONES: Degenerative changes.    IMPRESSION:  No acute pathology in the abdomen or pelvis.    Indeterminate hepatic dome lesion. Nonemergent hepatic protocol MRI is   recommended for further characterization       Initial GI Consult    Patient is a 74y old  Female who presents with a chief complaint of abdominal pain     HPI: 73 yo F with hypothyroidism, HTN who presents for abdominal pain over the past few days. The patient reports onset of right upper quadrant pain that radiated to the back and her left shoulder starting on last Friday. She was having fevers at that time without chills, N/V, diarrhea, constipation. The patient now feels well with resolution of abdominal pain and is tolerating PO diet. She denies family or personal history of liver disease. She has history of autoimmune thyroid disease.       PAST MEDICAL & SURGICAL HISTORY:  Hypothyroid      HTN (hypertension)      S/P appendectomy      H/O umbilical hernia repair        FAMILY HISTORY:  No pertinent family history in first degree relatives        MEDS:  MEDICATIONS  (STANDING):  folic acid 1 milliGRAM(s) Oral daily  influenza  Vaccine (HIGH DOSE) 0.5 milliLiter(s) IntraMuscular once  levothyroxine 112 MICROGram(s) Oral daily  memantine 5 milliGRAM(s) Oral two times a day    MEDICATIONS  (PRN):  acetaminophen     Tablet .. 650 milliGRAM(s) Oral every 6 hours PRN Temp greater or equal to 38C (100.4F), Mild Pain (1 - 3)  aluminum hydroxide/magnesium hydroxide/simethicone Suspension 30 milliLiter(s) Oral every 4 hours PRN Dyspepsia  melatonin 3 milliGRAM(s) Oral at bedtime PRN Insomnia  ondansetron Injectable 4 milliGRAM(s) IV Push every 8 hours PRN Nausea and/or Vomiting    Allergies    No Known Allergies    Intolerances        ROS: As per HPI    ______________________________________________________________________  PHYSICAL EXAM:  T(C): 36.9 (09-11-24 @ 05:09), Max: 36.9 (09-11-24 @ 05:09)  HR: 54 (09-11-24 @ 05:09)  BP: 127/61 (09-11-24 @ 05:09)  RR: 17 (09-11-24 @ 05:09)  SpO2: 94% (09-11-24 @ 05:09)  Wt(kg): --    09-10  -  09-11  --------------------------------------------------------  IN:    Oral Fluid: 1081 mL  Total IN: 1081 mL    OUT:    Stool (mL): 0 mL  Total OUT: 0 mL    Total NET: 1081 mL          GEN: NAD, normocephalic  CVS: S1S2+  CHEST: clear to auscultation  ABD: soft , nontender, nondistended, bowel sounds present  EXTR: no cyanosis, no clubbing, no edema  NEURO: A&OX3  SKIN:  warm;  non icteric    ______________________________________________________________________  LABS:                        11.4   7.89  )-----------( 238      ( 10 Sep 2024 10:26 )             34.6     09-11    142  |  109<H>  |  8   ----------------------------<  102<H>  4.2   |  22  |  0.52    Ca    9.0      11 Sep 2024 05:59  Mg     2.0     09-10    TPro  6.4  /  Alb  3.3  /  TBili  0.5  /  DBili  x   /  AST  27  /  ALT  122<H>  /  AlkPhos  192<H>  09-11    LIVER FUNCTIONS - ( 11 Sep 2024 05:59 )  Alb: 3.3 g/dL / Pro: 6.4 g/dL / ALK PHOS: 192 U/L / ALT: 122 U/L / AST: 27 U/L / GGT: x           PT/INR - ( 09 Sep 2024 20:37 )   PT: 10.8 sec;   INR: 0.98 ratio         PTT - ( 09 Sep 2024 20:37 )  PTT:30.2 sec  ____________________________________________      ACC: [id number] EXAM:  CT ABDOMEN AND PELVIS IC   ORDERED BY: IBRAHIMA ÁLVAREZ     PROCEDURE DATE:  09/09/2024          INTERPRETATION:  CLINICAL INFORMATION: Epigastric pain    COMPARISON: None.    CONTRAST/COMPLICATIONS:  IV Contrast: Omnipaque 350  90 cc administered   10 cc discarded  Oral Contrast: NONE  Complications: None reported at time of study completion    PROCEDURE:  CT of the Abdomen and Pelvis was performed.  Sagittal and coronal reformats were performed.    FINDINGS:  LOWER CHEST: Bibasilar subsegmental atelectasis. Coronary artery   calcifications.    LIVER: 2 7 cm ill-defined indeterminate hepatic dome lesion (3/13) with   central hypoattenuation.  BILE DUCTS: Normal caliber.  GALLBLADDER: Within normal limits.  SPLEEN:Within normal limits.  PANCREAS: Within normal limits.  ADRENALS: Within normal limits.  KIDNEYS/URETERS: Symmetric renal enhancement. No hydronephrosis.    BLADDER: Within normal limits.  REPRODUCTIVE ORGANS: Unremarkable uterus. Bilateral simple appearing   adnexal cysts measuring 2.0 cm.    BOWEL: No bowel obstruction. Appendix is not visualized. No evidence of   inflammation in the pericecal region.  PERITONEUM/RETROPERITONEUM: No pneumoperitoneum or ascites. Hazy   stranding around the origin of the celiac axis and SMA of uncertain   clinical significance, could reflect early retroperitoneal fibrosis.  VESSELS: Atherosclerotic changes.  LYMPH NODES: No lymphadenopathy.  ABDOMINAL WALL: Within normal limits.  BONES: Degenerative changes.    IMPRESSION:  No acute pathology in the abdomen or pelvis.    Indeterminate hepatic dome lesion. Nonemergent hepatic protocol MRI is   recommended for further characterization        ACC: [id number] EXAM:  MR ABDOMEN WAW IC   ORDERED BY: BERTO KINSEY     PROCEDURE DATE:  09/10/2024          INTERPRETATION:  CLINICAL INFORMATION: Elevated lipase,   transaminitis/elevated alkaline phosphatase and mid abdominal pain.    COMPARISON: CT abdomen and pelvis with IV contrast and right upper   quadrant ultrasound 9/9/2024    CONTRAST/COMPLICATIONS:  IV Contrast: Gadavist  6.5cc cc administered   1.0cc cc discarded  Oral Contrast: NONE  Complications: None reported at time of study completion    PROCEDURE:  MRI of the abdomen was performed.  MRCP was performed.    FINDINGS:  LOWER CHEST: Within normal limits.    LIVER/BILE DUCTS: Multifocal peribiliary/ductal enhancement with   corresponding restricted diffusion consistent with cholangitis. This   includes an approximately 3 cm area within the right hepatic dome (22:18)   questioned as an indeterminant hypoattenuating lesion on prior CT.    There is no intra or extrahepatic biliary dilatation. The common bile   duct measures 4 mm. There is mild concentric wall thickening and   enhancement of the distal common bile duct. There is a 3 mm impacted   stone at the ampulla and a 2 mm stone in the distal CBD.    GALLBLADDER: Cholelithiasis.  SPLEEN: Within normal limits.  PANCREAS: The pancreas enhances homogeneously and is normal in signal.   Main pancreatic duct is normal in caliber.  ADRENALS: Within normal limits.  KIDNEYS/URETERS: Mild bilateral perinephric stranding. Otherwise, within   normal limits.    VISUALIZED PORTIONS:  BOWEL: Within normal limits.  PERITONEUM: No ascites.  VESSELS: The portal vein, splenic vein, and SMV are patent. No splenic   artery or GDA pseudoaneurysm/hemorrhage.  RETROPERITONEUM/LYMPH NODES: A mildly enlarged peripancreatic lymph node   measuring up to 1.0 cm (27:26), likely reactive.  ABDOMINAL WALL: Within normal limits.  BONES: Within normal limits.    IMPRESSION: Ampullary and distal CBD stones. Mild cholangitis with tiny   scattered peripheral associated biliary abscesses.    --- End of Report ---      PETER ANGEL MD; Resident Radiologist  This document has been electronically signed.  FANG PEREZ MD; Attending Radiologist  This document has been electronically signed. Sep 11 2024 12:26PM Initial GI Consult    Patient is a 74y old  Female who presents with a chief complaint of abdominal pain     HPI: 75 yo F with hypothyroidism, HTN who presents for abdominal pain over the past few days. The patient reports onset of right upper quadrant pain that radiated to the back and her left shoulder starting on last Friday. She was having fevers at that time without chills, N/V, diarrhea, constipation. The patient now feels well with resolution of abdominal pain and is tolerating PO diet. She denies family or personal history of liver disease. She has history of autoimmune thyroid disease. She reports drinking alcohol daily (1 glass of wine) and has been doing so for many years.       PAST MEDICAL & SURGICAL HISTORY:  Hypothyroid      HTN (hypertension)      S/P appendectomy      H/O umbilical hernia repair        FAMILY HISTORY:  No pertinent family history in first degree relatives        MEDS:  MEDICATIONS  (STANDING):  folic acid 1 milliGRAM(s) Oral daily  influenza  Vaccine (HIGH DOSE) 0.5 milliLiter(s) IntraMuscular once  levothyroxine 112 MICROGram(s) Oral daily  memantine 5 milliGRAM(s) Oral two times a day    MEDICATIONS  (PRN):  acetaminophen     Tablet .. 650 milliGRAM(s) Oral every 6 hours PRN Temp greater or equal to 38C (100.4F), Mild Pain (1 - 3)  aluminum hydroxide/magnesium hydroxide/simethicone Suspension 30 milliLiter(s) Oral every 4 hours PRN Dyspepsia  melatonin 3 milliGRAM(s) Oral at bedtime PRN Insomnia  ondansetron Injectable 4 milliGRAM(s) IV Push every 8 hours PRN Nausea and/or Vomiting    Allergies    No Known Allergies    Intolerances        ROS: As per HPI    ______________________________________________________________________  PHYSICAL EXAM:  T(C): 36.9 (09-11-24 @ 05:09), Max: 36.9 (09-11-24 @ 05:09)  HR: 54 (09-11-24 @ 05:09)  BP: 127/61 (09-11-24 @ 05:09)  RR: 17 (09-11-24 @ 05:09)  SpO2: 94% (09-11-24 @ 05:09)  Wt(kg): --    09-10  -  09-11  --------------------------------------------------------  IN:    Oral Fluid: 1081 mL  Total IN: 1081 mL    OUT:    Stool (mL): 0 mL  Total OUT: 0 mL    Total NET: 1081 mL          GEN: NAD, normocephalic  CVS: S1S2+  CHEST: clear to auscultation  ABD: soft , nontender, nondistended, bowel sounds present  EXTR: no cyanosis, no clubbing, no edema  NEURO: A&OX3  SKIN:  warm;  non icteric    ______________________________________________________________________  LABS:                        11.4   7.89  )-----------( 238      ( 10 Sep 2024 10:26 )             34.6     09-11    142  |  109<H>  |  8   ----------------------------<  102<H>  4.2   |  22  |  0.52    Ca    9.0      11 Sep 2024 05:59  Mg     2.0     09-10    TPro  6.4  /  Alb  3.3  /  TBili  0.5  /  DBili  x   /  AST  27  /  ALT  122<H>  /  AlkPhos  192<H>  09-11    LIVER FUNCTIONS - ( 11 Sep 2024 05:59 )  Alb: 3.3 g/dL / Pro: 6.4 g/dL / ALK PHOS: 192 U/L / ALT: 122 U/L / AST: 27 U/L / GGT: x           PT/INR - ( 09 Sep 2024 20:37 )   PT: 10.8 sec;   INR: 0.98 ratio         PTT - ( 09 Sep 2024 20:37 )  PTT:30.2 sec  ____________________________________________      ACC: [id number] EXAM:  CT ABDOMEN AND PELVIS IC   ORDERED BY: IBRAHIMA ÁLVAREZ     PROCEDURE DATE:  09/09/2024          INTERPRETATION:  CLINICAL INFORMATION: Epigastric pain    COMPARISON: None.    CONTRAST/COMPLICATIONS:  IV Contrast: Omnipaque 350  90 cc administered   10 cc discarded  Oral Contrast: NONE  Complications: None reported at time of study completion    PROCEDURE:  CT of the Abdomen and Pelvis was performed.  Sagittal and coronal reformats were performed.    FINDINGS:  LOWER CHEST: Bibasilar subsegmental atelectasis. Coronary artery   calcifications.    LIVER: 2 7 cm ill-defined indeterminate hepatic dome lesion (3/13) with   central hypoattenuation.  BILE DUCTS: Normal caliber.  GALLBLADDER: Within normal limits.  SPLEEN:Within normal limits.  PANCREAS: Within normal limits.  ADRENALS: Within normal limits.  KIDNEYS/URETERS: Symmetric renal enhancement. No hydronephrosis.    BLADDER: Within normal limits.  REPRODUCTIVE ORGANS: Unremarkable uterus. Bilateral simple appearing   adnexal cysts measuring 2.0 cm.    BOWEL: No bowel obstruction. Appendix is not visualized. No evidence of   inflammation in the pericecal region.  PERITONEUM/RETROPERITONEUM: No pneumoperitoneum or ascites. Hazy   stranding around the origin of the celiac axis and SMA of uncertain   clinical significance, could reflect early retroperitoneal fibrosis.  VESSELS: Atherosclerotic changes.  LYMPH NODES: No lymphadenopathy.  ABDOMINAL WALL: Within normal limits.  BONES: Degenerative changes.    IMPRESSION:  No acute pathology in the abdomen or pelvis.    Indeterminate hepatic dome lesion. Nonemergent hepatic protocol MRI is   recommended for further characterization        ACC: [id number] EXAM:  MR ABDOMEN WAW IC   ORDERED BY: BERTO KINSEY     PROCEDURE DATE:  09/10/2024          INTERPRETATION:  CLINICAL INFORMATION: Elevated lipase,   transaminitis/elevated alkaline phosphatase and mid abdominal pain.    COMPARISON: CT abdomen and pelvis with IV contrast and right upper   quadrant ultrasound 9/9/2024    CONTRAST/COMPLICATIONS:  IV Contrast: Gadavist  6.5cc cc administered   1.0cc cc discarded  Oral Contrast: NONE  Complications: None reported at time of study completion    PROCEDURE:  MRI of the abdomen was performed.  MRCP was performed.    FINDINGS:  LOWER CHEST: Within normal limits.    LIVER/BILE DUCTS: Multifocal peribiliary/ductal enhancement with   corresponding restricted diffusion consistent with cholangitis. This   includes an approximately 3 cm area within the right hepatic dome (22:18)   questioned as an indeterminant hypoattenuating lesion on prior CT.    There is no intra or extrahepatic biliary dilatation. The common bile   duct measures 4 mm. There is mild concentric wall thickening and   enhancement of the distal common bile duct. There is a 3 mm impacted   stone at the ampulla and a 2 mm stone in the distal CBD.    GALLBLADDER: Cholelithiasis.  SPLEEN: Within normal limits.  PANCREAS: The pancreas enhances homogeneously and is normal in signal.   Main pancreatic duct is normal in caliber.  ADRENALS: Within normal limits.  KIDNEYS/URETERS: Mild bilateral perinephric stranding. Otherwise, within   normal limits.    VISUALIZED PORTIONS:  BOWEL: Within normal limits.  PERITONEUM: No ascites.  VESSELS: The portal vein, splenic vein, and SMV are patent. No splenic   artery or GDA pseudoaneurysm/hemorrhage.  RETROPERITONEUM/LYMPH NODES: A mildly enlarged peripancreatic lymph node   measuring up to 1.0 cm (27:26), likely reactive.  ABDOMINAL WALL: Within normal limits.  BONES: Within normal limits.    IMPRESSION: Ampullary and distal CBD stones. Mild cholangitis with tiny   scattered peripheral associated biliary abscesses.    --- End of Report ---      PETER ANGEL MD; Resident Radiologist  This document has been electronically signed.  FANG PEREZ MD; Attending Radiologist  This document has been electronically signed. Sep 11 2024 12:26PM

## 2024-09-12 ENCOUNTER — RESULT REVIEW (OUTPATIENT)
Age: 74
End: 2024-09-12

## 2024-09-12 LAB
ALBUMIN SERPL ELPH-MCNC: 3.6 G/DL — SIGNIFICANT CHANGE UP (ref 3.3–5)
ALP SERPL-CCNC: 231 U/L — HIGH (ref 40–120)
ALT FLD-CCNC: 106 U/L — HIGH (ref 10–45)
ANA PAT FLD IF-IMP: ABNORMAL
ANA TITR SER: ABNORMAL
ANION GAP SERPL CALC-SCNC: 13 MMOL/L — SIGNIFICANT CHANGE UP (ref 5–17)
AST SERPL-CCNC: 27 U/L — SIGNIFICANT CHANGE UP (ref 10–40)
BILIRUB SERPL-MCNC: 0.7 MG/DL — SIGNIFICANT CHANGE UP (ref 0.2–1.2)
BUN SERPL-MCNC: 10 MG/DL — SIGNIFICANT CHANGE UP (ref 7–23)
CALCIUM SERPL-MCNC: 9 MG/DL — SIGNIFICANT CHANGE UP (ref 8.4–10.5)
CHLORIDE SERPL-SCNC: 105 MMOL/L — SIGNIFICANT CHANGE UP (ref 96–108)
CO2 SERPL-SCNC: 22 MMOL/L — SIGNIFICANT CHANGE UP (ref 22–31)
CREAT SERPL-MCNC: 0.64 MG/DL — SIGNIFICANT CHANGE UP (ref 0.5–1.3)
EGFR: 92 ML/MIN/1.73M2 — SIGNIFICANT CHANGE UP
GLUCOSE SERPL-MCNC: 107 MG/DL — HIGH (ref 70–99)
HAV IGM SER-ACNC: SIGNIFICANT CHANGE UP
HBV CORE IGM SER-ACNC: SIGNIFICANT CHANGE UP
HBV SURFACE AG SER-ACNC: SIGNIFICANT CHANGE UP
HCT VFR BLD CALC: 36 % — SIGNIFICANT CHANGE UP (ref 34.5–45)
HCV AB S/CO SERPL IA: 0.09 S/CO — SIGNIFICANT CHANGE UP (ref 0–0.99)
HCV AB SERPL-IMP: SIGNIFICANT CHANGE UP
HGB BLD-MCNC: 12 G/DL — SIGNIFICANT CHANGE UP (ref 11.5–15.5)
INR BLD: 0.99 RATIO — SIGNIFICANT CHANGE UP (ref 0.85–1.18)
MCHC RBC-ENTMCNC: 28.5 PG — SIGNIFICANT CHANGE UP (ref 27–34)
MCHC RBC-ENTMCNC: 33.3 GM/DL — SIGNIFICANT CHANGE UP (ref 32–36)
MCV RBC AUTO: 85.5 FL — SIGNIFICANT CHANGE UP (ref 80–100)
MITOCHONDRIA AB SER-ACNC: SIGNIFICANT CHANGE UP
NRBC # BLD: 0 /100 WBCS — SIGNIFICANT CHANGE UP (ref 0–0)
PLATELET # BLD AUTO: 284 K/UL — SIGNIFICANT CHANGE UP (ref 150–400)
POTASSIUM SERPL-MCNC: 3.8 MMOL/L — SIGNIFICANT CHANGE UP (ref 3.5–5.3)
POTASSIUM SERPL-SCNC: 3.8 MMOL/L — SIGNIFICANT CHANGE UP (ref 3.5–5.3)
PROT SERPL-MCNC: 6.7 G/DL — SIGNIFICANT CHANGE UP (ref 6–8.3)
PROTHROM AB SERPL-ACNC: 10.9 SEC — SIGNIFICANT CHANGE UP (ref 9.5–13)
RBC # BLD: 4.21 M/UL — SIGNIFICANT CHANGE UP (ref 3.8–5.2)
RBC # FLD: 14.5 % — SIGNIFICANT CHANGE UP (ref 10.3–14.5)
SMOOTH MUSCLE AB SER-ACNC: SIGNIFICANT CHANGE UP
SODIUM SERPL-SCNC: 140 MMOL/L — SIGNIFICANT CHANGE UP (ref 135–145)
WBC # BLD: 9.25 K/UL — SIGNIFICANT CHANGE UP (ref 3.8–10.5)
WBC # FLD AUTO: 9.25 K/UL — SIGNIFICANT CHANGE UP (ref 3.8–10.5)

## 2024-09-12 PROCEDURE — 88342 IMHCHEM/IMCYTCHM 1ST ANTB: CPT | Mod: 26

## 2024-09-12 PROCEDURE — 43264 ERCP REMOVE DUCT CALCULI: CPT | Mod: GC

## 2024-09-12 PROCEDURE — 88341 IMHCHEM/IMCYTCHM EA ADD ANTB: CPT | Mod: 26

## 2024-09-12 PROCEDURE — 43262 ENDO CHOLANGIOPANCREATOGRAPH: CPT | Mod: GC

## 2024-09-12 PROCEDURE — 43239 EGD BIOPSY SINGLE/MULTIPLE: CPT | Mod: GC

## 2024-09-12 PROCEDURE — 88305 TISSUE EXAM BY PATHOLOGIST: CPT | Mod: 26

## 2024-09-12 PROCEDURE — 74328 X-RAY BILE DUCT ENDOSCOPY: CPT | Mod: 26,GC

## 2024-09-12 PROCEDURE — 99233 SBSQ HOSP IP/OBS HIGH 50: CPT

## 2024-09-12 DEVICE — CATH BLLN EXTRACT DIST GUIDE WIRE 15MM 3LUM: Type: IMPLANTABLE DEVICE | Status: FUNCTIONAL

## 2024-09-12 DEVICE — CLIP HEMO INSTINCT PLUS ENDOSCOPIC: Type: IMPLANTABLE DEVICE | Status: FUNCTIONAL

## 2024-09-12 DEVICE — AUTOTOME CANNULATING SPHINCTEROTOME RX 44 20MM: Type: IMPLANTABLE DEVICE | Status: FUNCTIONAL

## 2024-09-12 DEVICE — HYDRATOME 44: Type: IMPLANTABLE DEVICE | Status: FUNCTIONAL

## 2024-09-12 DEVICE — BLLN EXTRACT FUSION QUATRO 8.5 10 12 15MM: Type: IMPLANTABLE DEVICE | Status: FUNCTIONAL

## 2024-09-12 RX ORDER — SODIUM CHLORIDE 9 MG/ML
1000 INJECTION INTRAMUSCULAR; INTRAVENOUS; SUBCUTANEOUS
Refills: 0 | Status: DISCONTINUED | OUTPATIENT
Start: 2024-09-12 | End: 2024-09-12

## 2024-09-12 RX ADMIN — MEMANTINE 5 MILLIGRAM(S): 7 CAPSULE, EXTENDED RELEASE ORAL at 05:14

## 2024-09-12 RX ADMIN — PIPERACILLIN SODIUM AND TAZOBACTAM SODIUM 25 GRAM(S): 3; .375 INJECTION, POWDER, FOR SOLUTION INTRAVENOUS at 09:13

## 2024-09-12 RX ADMIN — PIPERACILLIN SODIUM AND TAZOBACTAM SODIUM 25 GRAM(S): 3; .375 INJECTION, POWDER, FOR SOLUTION INTRAVENOUS at 00:37

## 2024-09-12 RX ADMIN — PIPERACILLIN SODIUM AND TAZOBACTAM SODIUM 25 GRAM(S): 3; .375 INJECTION, POWDER, FOR SOLUTION INTRAVENOUS at 18:11

## 2024-09-12 RX ADMIN — MEMANTINE 5 MILLIGRAM(S): 7 CAPSULE, EXTENDED RELEASE ORAL at 18:12

## 2024-09-12 RX ADMIN — Medication 112 MICROGRAM(S): at 05:14

## 2024-09-12 RX ADMIN — Medication 1 MILLIGRAM(S): at 18:12

## 2024-09-12 NOTE — PRE-OP CHECKLIST - INTERNAL PROSTHESES
Discharge paperwork discussed and provided by this RN  Ibuprofen/Tylenol information pamphlet provided   Pt's mother verbalizes understanding   Questions/concerns addressed  Pt leaving ED in stable condition at this time     no

## 2024-09-12 NOTE — PROGRESS NOTE ADULT - SUBJECTIVE AND OBJECTIVE BOX
Patient is a 74y old  Female who presents with a chief complaint of abdominal pain x few days (11 Sep 2024 14:50)      SUBJECTIVE / OVERNIGHT EVENTS: no complaints     MEDICATIONS  (STANDING):  folic acid 1 milliGRAM(s) Oral daily  influenza  Vaccine (HIGH DOSE) 0.5 milliLiter(s) IntraMuscular once  levothyroxine 112 MICROGram(s) Oral daily  memantine 5 milliGRAM(s) Oral two times a day  piperacillin/tazobactam IVPB.. 3.375 Gram(s) IV Intermittent every 8 hours  sodium chloride 0.9%. 1000 milliLiter(s) (75 mL/Hr) IV Continuous <Continuous>    MEDICATIONS  (PRN):  acetaminophen     Tablet .. 650 milliGRAM(s) Oral every 6 hours PRN Temp greater or equal to 38C (100.4F), Mild Pain (1 - 3)  aluminum hydroxide/magnesium hydroxide/simethicone Suspension 30 milliLiter(s) Oral every 4 hours PRN Dyspepsia  melatonin 3 milliGRAM(s) Oral at bedtime PRN Insomnia  ondansetron Injectable 4 milliGRAM(s) IV Push every 8 hours PRN Nausea and/or Vomiting        CAPILLARY BLOOD GLUCOSE        I&O's Summary    11 Sep 2024 07:01  -  12 Sep 2024 07:00  --------------------------------------------------------  IN: 700 mL / OUT: 0 mL / NET: 700 mL        PHYSICAL EXAM:  GENERAL: NAD, well-developed  HEAD:  Atraumatic, Normocephalic  EYES:conjunctiva and sclera clear  NECK: No JVD  CHEST/LUNG: Clear to auscultation bilaterally; No wheeze  HEART: Regular rate and rhythm; No murmurs, rubs, or gallops  ABDOMEN: Soft, Nontender, Nondistended; Bowel sounds present  EXTREMITIES:  2+ Peripheral Pulses, No clubbing, cyanosis, or edema  PSYCH: AAOx3      LABS:                        12.0   9.25  )-----------( 284      ( 12 Sep 2024 05:30 )             36.0     09-12    140  |  105  |  10  ----------------------------<  107<H>  3.8   |  22  |  0.64    Ca    9.0      12 Sep 2024 05:30    TPro  6.7  /  Alb  3.6  /  TBili  0.7  /  DBili  x   /  AST  27  /  ALT  106<H>  /  AlkPhos  231<H>  09-12    PT/INR - ( 12 Sep 2024 05:30 )   PT: 10.9 sec;   INR: 0.99 ratio               Urinalysis Basic - ( 12 Sep 2024 05:30 )    Color: x / Appearance: x / SG: x / pH: x  Gluc: 107 mg/dL / Ketone: x  / Bili: x / Urobili: x   Blood: x / Protein: x / Nitrite: x   Leuk Esterase: x / RBC: x / WBC x   Sq Epi: x / Non Sq Epi: x / Bacteria: x        RADIOLOGY & ADDITIONAL TESTS:    Imaging Personally Reviewed:    Consultant(s) Notes Reviewed:      Care Discussed with Consultants/Other Providers:

## 2024-09-13 LAB
ALBUMIN SERPL ELPH-MCNC: 3.5 G/DL — SIGNIFICANT CHANGE UP (ref 3.3–5)
ALP SERPL-CCNC: 380 U/L — HIGH (ref 40–120)
ALT FLD-CCNC: 148 U/L — HIGH (ref 10–45)
ANION GAP SERPL CALC-SCNC: 11 MMOL/L — SIGNIFICANT CHANGE UP (ref 5–17)
APTT BLD: 34.5 SEC — SIGNIFICANT CHANGE UP (ref 24.5–35.6)
AST SERPL-CCNC: 98 U/L — HIGH (ref 10–40)
BILIRUB SERPL-MCNC: 1 MG/DL — SIGNIFICANT CHANGE UP (ref 0.2–1.2)
BUN SERPL-MCNC: 7 MG/DL — SIGNIFICANT CHANGE UP (ref 7–23)
CALCIUM SERPL-MCNC: 9 MG/DL — SIGNIFICANT CHANGE UP (ref 8.4–10.5)
CHLORIDE SERPL-SCNC: 105 MMOL/L — SIGNIFICANT CHANGE UP (ref 96–108)
CO2 SERPL-SCNC: 24 MMOL/L — SIGNIFICANT CHANGE UP (ref 22–31)
CREAT SERPL-MCNC: 0.69 MG/DL — SIGNIFICANT CHANGE UP (ref 0.5–1.3)
EGFR: 90 ML/MIN/1.73M2 — SIGNIFICANT CHANGE UP
GLUCOSE SERPL-MCNC: 94 MG/DL — SIGNIFICANT CHANGE UP (ref 70–99)
HCT VFR BLD CALC: 36.4 % — SIGNIFICANT CHANGE UP (ref 34.5–45)
HGB BLD-MCNC: 12.2 G/DL — SIGNIFICANT CHANGE UP (ref 11.5–15.5)
INR BLD: 1.07 RATIO — SIGNIFICANT CHANGE UP (ref 0.85–1.18)
MCHC RBC-ENTMCNC: 28.6 PG — SIGNIFICANT CHANGE UP (ref 27–34)
MCHC RBC-ENTMCNC: 33.5 GM/DL — SIGNIFICANT CHANGE UP (ref 32–36)
MCV RBC AUTO: 85.4 FL — SIGNIFICANT CHANGE UP (ref 80–100)
NRBC # BLD: 0 /100 WBCS — SIGNIFICANT CHANGE UP (ref 0–0)
PLATELET # BLD AUTO: 329 K/UL — SIGNIFICANT CHANGE UP (ref 150–400)
POTASSIUM SERPL-MCNC: 3.5 MMOL/L — SIGNIFICANT CHANGE UP (ref 3.5–5.3)
POTASSIUM SERPL-SCNC: 3.5 MMOL/L — SIGNIFICANT CHANGE UP (ref 3.5–5.3)
PROT SERPL-MCNC: 6.8 G/DL — SIGNIFICANT CHANGE UP (ref 6–8.3)
PROTHROM AB SERPL-ACNC: 11.7 SEC — SIGNIFICANT CHANGE UP (ref 9.5–13)
RBC # BLD: 4.26 M/UL — SIGNIFICANT CHANGE UP (ref 3.8–5.2)
RBC # FLD: 14.5 % — SIGNIFICANT CHANGE UP (ref 10.3–14.5)
SODIUM SERPL-SCNC: 140 MMOL/L — SIGNIFICANT CHANGE UP (ref 135–145)
WBC # BLD: 9.83 K/UL — SIGNIFICANT CHANGE UP (ref 3.8–10.5)
WBC # FLD AUTO: 9.83 K/UL — SIGNIFICANT CHANGE UP (ref 3.8–10.5)

## 2024-09-13 PROCEDURE — 99233 SBSQ HOSP IP/OBS HIGH 50: CPT

## 2024-09-13 PROCEDURE — 99222 1ST HOSP IP/OBS MODERATE 55: CPT

## 2024-09-13 PROCEDURE — 99232 SBSQ HOSP IP/OBS MODERATE 35: CPT | Mod: GC

## 2024-09-13 RX ORDER — FAMOTIDINE 10 MG/ML
20 INJECTION INTRAVENOUS ONCE
Refills: 0 | Status: COMPLETED | OUTPATIENT
Start: 2024-09-13 | End: 2024-09-13

## 2024-09-13 RX ORDER — LOSARTAN POTASSIUM 50 MG/1
50 TABLET ORAL DAILY
Refills: 0 | Status: DISCONTINUED | OUTPATIENT
Start: 2024-09-13 | End: 2024-09-15

## 2024-09-13 RX ADMIN — Medication 112 MICROGRAM(S): at 06:15

## 2024-09-13 RX ADMIN — MEMANTINE 5 MILLIGRAM(S): 7 CAPSULE, EXTENDED RELEASE ORAL at 06:15

## 2024-09-13 RX ADMIN — PIPERACILLIN SODIUM AND TAZOBACTAM SODIUM 25 GRAM(S): 3; .375 INJECTION, POWDER, FOR SOLUTION INTRAVENOUS at 00:32

## 2024-09-13 RX ADMIN — MEMANTINE 5 MILLIGRAM(S): 7 CAPSULE, EXTENDED RELEASE ORAL at 18:19

## 2024-09-13 RX ADMIN — Medication 1 MILLIGRAM(S): at 12:40

## 2024-09-13 RX ADMIN — PIPERACILLIN SODIUM AND TAZOBACTAM SODIUM 25 GRAM(S): 3; .375 INJECTION, POWDER, FOR SOLUTION INTRAVENOUS at 09:15

## 2024-09-13 RX ADMIN — ONDANSETRON 4 MILLIGRAM(S): 2 INJECTION, SOLUTION INTRAMUSCULAR; INTRAVENOUS at 20:44

## 2024-09-13 RX ADMIN — PIPERACILLIN SODIUM AND TAZOBACTAM SODIUM 25 GRAM(S): 3; .375 INJECTION, POWDER, FOR SOLUTION INTRAVENOUS at 18:20

## 2024-09-13 RX ADMIN — FAMOTIDINE 20 MILLIGRAM(S): 10 INJECTION INTRAVENOUS at 22:17

## 2024-09-13 NOTE — CONSULT NOTE ADULT - ASSESSMENT
74F PMH HTN, hypothyroidism post procedure day 1 from ERCP for choledocholithiasis and cholangitis. General surgery consulted for possible inpatient cholecystectomy.     Recommendations:  - Repeat LFTs in AM to trend  - Plan for further intervention based on discussion with patient in AM  - Possible add on lap salas for Monday    The above discussed with Dr. Stokes    Trauma/ACS  [id number]

## 2024-09-13 NOTE — PROGRESS NOTE ADULT - SUBJECTIVE AND OBJECTIVE BOX
Gastroenterology/Hepatology Progress Note    Interval Events:   - no acute ON events  - no abd pain    Allergies:  No Known Allergies      Hospital Medications:  acetaminophen     Tablet .. 650 milliGRAM(s) Oral every 6 hours PRN  aluminum hydroxide/magnesium hydroxide/simethicone Suspension 30 milliLiter(s) Oral every 4 hours PRN  folic acid 1 milliGRAM(s) Oral daily  influenza  Vaccine (HIGH DOSE) 0.5 milliLiter(s) IntraMuscular once  levothyroxine 112 MICROGram(s) Oral daily  melatonin 3 milliGRAM(s) Oral at bedtime PRN  memantine 5 milliGRAM(s) Oral two times a day  ondansetron Injectable 4 milliGRAM(s) IV Push every 8 hours PRN  piperacillin/tazobactam IVPB.. 3.375 Gram(s) IV Intermittent every 8 hours      ROS: 14 point ROS negative unless otherwise state in subjective    PHYSICAL EXAM:   Vital Signs:  Vital Signs Last 24 Hrs  T(C): 36.6 (13 Sep 2024 05:06), Max: 36.8 (12 Sep 2024 20:01)  T(F): 97.9 (13 Sep 2024 05:06), Max: 98.2 (12 Sep 2024 20:01)  HR: 55 (13 Sep 2024 05:06) (48 - 74)  BP: 112/70 (13 Sep 2024 05:06) (112/70 - 151/84)  BP(mean): --  RR: 18 (13 Sep 2024 05:06) (18 - 20)  SpO2: 95% (13 Sep 2024 05:06) (94% - 98%)    Parameters below as of 13 Sep 2024 05:06  Patient On (Oxygen Delivery Method): room air      Daily     Daily     GENERAL:  No acute distress  HEENT:  NCAT, no scleral icterus, hard on hearing  CHEST:  no respiratory distress  HEART:  Regular rate and rhythm  ABDOMEN:  Soft, non-tender, non-distended, no masses  EXTREMITIES: No edema  SKIN:  No rash/erythema/ecchymoses/petechiae/wounds/abscess/warm/dry  NEURO:  Alert and oriented x 3     LABS:                        12.2   9.83  )-----------( 329      ( 13 Sep 2024 07:50 )             36.4     Mean Cell Volume: 85.4 fl (09-13-24 @ 07:50)    09-13    140  |  105  |  7   ----------------------------<  94  3.5   |  24  |  0.69    Ca    9.0      13 Sep 2024 07:50    TPro  6.8  /  Alb  3.5  /  TBili  1.0  /  DBili  x   /  AST  98<H>  /  ALT  148<H>  /  AlkPhos  380<H>  09-13    LIVER FUNCTIONS - ( 13 Sep 2024 07:50 )  Alb: 3.5 g/dL / Pro: 6.8 g/dL / ALK PHOS: 380 U/L / ALT: 148 U/L / AST: 98 U/L / GGT: x           PT/INR - ( 12 Sep 2024 05:30 )   PT: 10.9 sec;   INR: 0.99 ratio           Urinalysis Basic - ( 13 Sep 2024 07:50 )    Color: x / Appearance: x / SG: x / pH: x  Gluc: 94 mg/dL / Ketone: x  / Bili: x / Urobili: x   Blood: x / Protein: x / Nitrite: x   Leuk Esterase: x / RBC: x / WBC x   Sq Epi: x / Non Sq Epi: x / Bacteria: x            Imaging:

## 2024-09-13 NOTE — PROGRESS NOTE ADULT - ATTENDING COMMENTS
Impression:    #1.  Nonsevere cholangitis and small hepatic abscesses due to bile duct stones, improving but still abnormal LFTs, now s/p ERCP/biliary sphincteromy/balloon extraction.      Recommendations:    #1.  Follow CBC/CMP    #2.  Antibiotics    #3.  GI advanced endoscopy team will sign off , call for questions/concerns.

## 2024-09-13 NOTE — PROGRESS NOTE ADULT - SUBJECTIVE AND OBJECTIVE BOX
Patient is a 74y old  Female who presents with a chief complaint of abdominal pain x few days (13 Sep 2024 11:58)      SUBJECTIVE / OVERNIGHT EVENTS: pt feels better, abdominal pain, no chills, no n/v    MEDICATIONS  (STANDING):  folic acid 1 milliGRAM(s) Oral daily  influenza  Vaccine (HIGH DOSE) 0.5 milliLiter(s) IntraMuscular once  levothyroxine 112 MICROGram(s) Oral daily  memantine 5 milliGRAM(s) Oral two times a day  piperacillin/tazobactam IVPB.. 3.375 Gram(s) IV Intermittent every 8 hours    MEDICATIONS  (PRN):  acetaminophen     Tablet .. 650 milliGRAM(s) Oral every 6 hours PRN Temp greater or equal to 38C (100.4F), Mild Pain (1 - 3)  aluminum hydroxide/magnesium hydroxide/simethicone Suspension 30 milliLiter(s) Oral every 4 hours PRN Dyspepsia  melatonin 3 milliGRAM(s) Oral at bedtime PRN Insomnia  ondansetron Injectable 4 milliGRAM(s) IV Push every 8 hours PRN Nausea and/or Vomiting        CAPILLARY BLOOD GLUCOSE        I&O's Summary    12 Sep 2024 07:01  -  13 Sep 2024 07:00  --------------------------------------------------------  IN: 100 mL / OUT: 0 mL / NET: 100 mL        PHYSICAL EXAM:  GENERAL: NAD, well-developed  HEAD:  Atraumatic, Normocephalic  EYES: conjunctiva and sclera clear  NECK:  No JVD  CHEST/LUNG: Clear to auscultation bilaterally; No wheeze  HEART: Regular rate and rhythm; S1S2  ABDOMEN: Soft, +mild ruq ttp , Nondistended; Bowel sounds present  EXTREMITIES:  2+ Peripheral Pulses, No clubbing, cyanosis, or edema  PSYCH: AAOx3  NEUROLOGY: non-focal  SKIN: No rashes or lesions    LABS:                        12.2   9.83  )-----------( 329      ( 13 Sep 2024 07:50 )             36.4     09-13    140  |  105  |  7   ----------------------------<  94  3.5   |  24  |  0.69    Ca    9.0      13 Sep 2024 07:50    TPro  6.8  /  Alb  3.5  /  TBili  1.0  /  DBili  x   /  AST  98<H>  /  ALT  148<H>  /  AlkPhos  380<H>  09-13    PT/INR - ( 12 Sep 2024 05:30 )   PT: 10.9 sec;   INR: 0.99 ratio               Urinalysis Basic - ( 13 Sep 2024 07:50 )    Color: x / Appearance: x / SG: x / pH: x  Gluc: 94 mg/dL / Ketone: x  / Bili: x / Urobili: x   Blood: x / Protein: x / Nitrite: x   Leuk Esterase: x / RBC: x / WBC x   Sq Epi: x / Non Sq Epi: x / Bacteria: x        RADIOLOGY & ADDITIONAL TESTS:    Imaging Personally Reviewed:    Consultant(s) Notes Reviewed:      Care Discussed with Consultants/Other Providers: GI, sx

## 2024-09-13 NOTE — CONSULT NOTE ADULT - ATTENDING COMMENTS
Patient seen and examined  Agree with house staff note    74 year old female with history of ventral hernia repair presents with signs / symptoms / imaging consistent with acute biliary obstruction.  ERCP performed yesterday with finding of choledocholithiasis (which was cleared).  Now states that pain is improved.  Exam benign. Large well healed midline incision on abdomen, from hernia repair as per patient.  LFTs increasing this AM from yesterday.    - Discussed the risks / benefits / alternatives to cholecystectomy with patient.  Discussed a higher than average risk of need for conversion from laparoscopic to open cholecystectomy due to possible previous placement of anterior abdominal wall mesh  - Will need to see LFTs trending down before consideration of cholecystectomy
As above.    Impression:    #1.  Nonsevere cholangitis and small hepatic abscesses due to bile duct stones     #2.  Improving abdominal pain    Recommendations:    #1.  Follow CBC/CMP    #2.  Antibiotics    #3.  NPO after MN for ERCP on 9/12/24

## 2024-09-13 NOTE — CONSULT NOTE ADULT - SUBJECTIVE AND OBJECTIVE BOX
HPI: Patient is a 74F PMH hypothyroidism, HTN, presents for abdominal pain over the past few days. Two days prior to admission, patient reports right upper abdominal pain, sharp in character , radiating to back , worse with food. She associated with fever at the time, no diarrhea, no nausea or vomiting.  Since then , pain has mostly resolved , but patient reports poor appetite and decreased oral intake, and generalized weakness. She was evaluated by her PMD and noted to have tenderness in her RUQ and subsequently referred to the hospital.     During her hospital course, patient found to have choledocholithiasis with associated cholangitis, now s/p ERCP. Patient reports that she is feeling better, but still reporting RUQ pain with palpation. Still denies nausea, vomiting, fevers and chills.             PAST MEDICAL & SURGICAL HISTORY:  Hypothyroid      HTN (hypertension)      S/P appendectomy      H/O umbilical hernia repair          Physical Exam:  General: NAD, resting comfortably  HEENT: NC/AT, EOMI  Pulmonary: Normal respiratory effort on RA  Cardiovascular: Normal rate, normotensive  Abdominal: Soft, nondistended, tender to palpation in the RUQ  Neuro: A/O x 3, CNs II-XII grossly intact, no focal deficits, moving extremities spontaneously  Extremities/Vascular: Warm, well perfused    Vital Signs Last 24 Hrs  T(C): 36.3 (13 Sep 2024 16:15), Max: 36.8 (12 Sep 2024 20:01)  T(F): 97.4 (13 Sep 2024 16:15), Max: 98.2 (12 Sep 2024 20:01)  HR: 60 (13 Sep 2024 16:15) (55 - 74)  BP: 136/73 (13 Sep 2024 16:15) (112/70 - 151/84)  BP(mean): --  RR: 18 (13 Sep 2024 16:15) (18 - 18)  SpO2: 97% (13 Sep 2024 16:15) (95% - 98%)    Parameters below as of 13 Sep 2024 16:15  Patient On (Oxygen Delivery Method): room air          RADIOLOGY & ADDITIONAL STUDIES:    09-13    140  |  105  |  7   ----------------------------<  94  3.5   |  24  |  0.69    Ca    9.0      13 Sep 2024 07:50    TPro  6.8  /  Alb  3.5  /  TBili  1.0  /  DBili  x   /  AST  98<H>  /  ALT  148<H>  /  AlkPhos  380<H>  09-13

## 2024-09-14 LAB
ADD ON TEST-SPECIMEN IN LAB: SIGNIFICANT CHANGE UP
ALBUMIN SERPL ELPH-MCNC: 3.8 G/DL — SIGNIFICANT CHANGE UP (ref 3.3–5)
ALP SERPL-CCNC: 340 U/L — HIGH (ref 40–120)
ALT FLD-CCNC: 116 U/L — HIGH (ref 10–45)
ANION GAP SERPL CALC-SCNC: 15 MMOL/L — SIGNIFICANT CHANGE UP (ref 5–17)
AST SERPL-CCNC: 40 U/L — SIGNIFICANT CHANGE UP (ref 10–40)
BILIRUB SERPL-MCNC: 0.8 MG/DL — SIGNIFICANT CHANGE UP (ref 0.2–1.2)
BUN SERPL-MCNC: 11 MG/DL — SIGNIFICANT CHANGE UP (ref 7–23)
CALCIUM SERPL-MCNC: 8.9 MG/DL — SIGNIFICANT CHANGE UP (ref 8.4–10.5)
CHLORIDE SERPL-SCNC: 106 MMOL/L — SIGNIFICANT CHANGE UP (ref 96–108)
CO2 SERPL-SCNC: 19 MMOL/L — LOW (ref 22–31)
CREAT SERPL-MCNC: 0.64 MG/DL — SIGNIFICANT CHANGE UP (ref 0.5–1.3)
EC EAEA GENE STL QL NAA+PROBE: DETECTED
EGFR: 92 ML/MIN/1.73M2 — SIGNIFICANT CHANGE UP
GI PCR PANEL: DETECTED
GLUCOSE SERPL-MCNC: 123 MG/DL — HIGH (ref 70–99)
HCT VFR BLD CALC: 42.1 % — SIGNIFICANT CHANGE UP (ref 34.5–45)
HGB BLD-MCNC: 13.9 G/DL — SIGNIFICANT CHANGE UP (ref 11.5–15.5)
LIDOCAIN IGE QN: 85 U/L — HIGH (ref 7–60)
MCHC RBC-ENTMCNC: 27.9 PG — SIGNIFICANT CHANGE UP (ref 27–34)
MCHC RBC-ENTMCNC: 33 GM/DL — SIGNIFICANT CHANGE UP (ref 32–36)
MCV RBC AUTO: 84.5 FL — SIGNIFICANT CHANGE UP (ref 80–100)
NOROVIRUS GI+II RNA STL QL NAA+NON-PROBE: DETECTED
NRBC # BLD: 0 /100 WBCS — SIGNIFICANT CHANGE UP (ref 0–0)
PLATELET # BLD AUTO: 386 K/UL — SIGNIFICANT CHANGE UP (ref 150–400)
POTASSIUM SERPL-MCNC: 3.6 MMOL/L — SIGNIFICANT CHANGE UP (ref 3.5–5.3)
POTASSIUM SERPL-SCNC: 3.6 MMOL/L — SIGNIFICANT CHANGE UP (ref 3.5–5.3)
PROT SERPL-MCNC: 7.6 G/DL — SIGNIFICANT CHANGE UP (ref 6–8.3)
RBC # BLD: 4.98 M/UL — SIGNIFICANT CHANGE UP (ref 3.8–5.2)
RBC # FLD: 14.6 % — HIGH (ref 10.3–14.5)
SODIUM SERPL-SCNC: 140 MMOL/L — SIGNIFICANT CHANGE UP (ref 135–145)
WBC # BLD: 9.9 K/UL — SIGNIFICANT CHANGE UP (ref 3.8–10.5)
WBC # FLD AUTO: 9.9 K/UL — SIGNIFICANT CHANGE UP (ref 3.8–10.5)

## 2024-09-14 PROCEDURE — 99232 SBSQ HOSP IP/OBS MODERATE 35: CPT

## 2024-09-14 RX ADMIN — ACETAMINOPHEN 650 MILLIGRAM(S): 325 TABLET ORAL at 20:15

## 2024-09-14 RX ADMIN — MEMANTINE 5 MILLIGRAM(S): 7 CAPSULE, EXTENDED RELEASE ORAL at 17:14

## 2024-09-14 RX ADMIN — Medication 112 MICROGRAM(S): at 05:16

## 2024-09-14 RX ADMIN — LOSARTAN POTASSIUM 50 MILLIGRAM(S): 50 TABLET ORAL at 05:16

## 2024-09-14 RX ADMIN — PIPERACILLIN SODIUM AND TAZOBACTAM SODIUM 25 GRAM(S): 3; .375 INJECTION, POWDER, FOR SOLUTION INTRAVENOUS at 01:04

## 2024-09-14 RX ADMIN — PIPERACILLIN SODIUM AND TAZOBACTAM SODIUM 25 GRAM(S): 3; .375 INJECTION, POWDER, FOR SOLUTION INTRAVENOUS at 08:26

## 2024-09-14 RX ADMIN — MEMANTINE 5 MILLIGRAM(S): 7 CAPSULE, EXTENDED RELEASE ORAL at 05:17

## 2024-09-14 RX ADMIN — PIPERACILLIN SODIUM AND TAZOBACTAM SODIUM 25 GRAM(S): 3; .375 INJECTION, POWDER, FOR SOLUTION INTRAVENOUS at 15:48

## 2024-09-14 RX ADMIN — ACETAMINOPHEN 650 MILLIGRAM(S): 325 TABLET ORAL at 19:23

## 2024-09-14 RX ADMIN — Medication 1 MILLIGRAM(S): at 12:29

## 2024-09-14 NOTE — PROGRESS NOTE ADULT - NSPROGADDITIONALINFOA_GEN_ALL_CORE
20 Bri Bryant   Hospitalist   available on TEAMS Reported Diarrhea --> cont to monitor  pt is not on bowel regimen--> f/up GI PCR   F/up Lipase         Bri Bryant   [facility]   available on TEAMS

## 2024-09-14 NOTE — PROGRESS NOTE ADULT - SUBJECTIVE AND OBJECTIVE BOX
Patient is a 74y old  Female who presents with a chief complaint of abdominal pain x few days (13 Sep 2024 18:01)      SUBJECTIVE / OVERNIGHT EVENTS:  Reported diarrhea overnight   TMax: 98.5 F     MEDICATIONS  (STANDING):  folic acid 1 milliGRAM(s) Oral daily  influenza  Vaccine (HIGH DOSE) 0.5 milliLiter(s) IntraMuscular once  levothyroxine 112 MICROGram(s) Oral daily  losartan 50 milliGRAM(s) Oral daily  memantine 5 milliGRAM(s) Oral two times a day  piperacillin/tazobactam IVPB.. 3.375 Gram(s) IV Intermittent every 8 hours    MEDICATIONS  (PRN):  acetaminophen     Tablet .. 650 milliGRAM(s) Oral every 6 hours PRN Temp greater or equal to 38C (100.4F), Mild Pain (1 - 3)  aluminum hydroxide/magnesium hydroxide/simethicone Suspension 30 milliLiter(s) Oral every 4 hours PRN Dyspepsia  melatonin 3 milliGRAM(s) Oral at bedtime PRN Insomnia  ondansetron Injectable 4 milliGRAM(s) IV Push every 8 hours PRN Nausea and/or Vomiting      CAPILLARY BLOOD GLUCOSE        I&O's Summary    13 Sep 2024 07:01  -  14 Sep 2024 07:00  --------------------------------------------------------  IN: 1160 mL / OUT: 150 mL / NET: 1010 mL    14 Sep 2024 07:01  -  14 Sep 2024 11:46  --------------------------------------------------------  IN: 60 mL / OUT: 200 mL / NET: -140 mL        PHYSICAL EXAM:  Vital Signs Last 24 Hrs  T(C): 36.9 (14 Sep 2024 04:45), Max: 36.9 (14 Sep 2024 04:45)  T(F): 98.5 (14 Sep 2024 04:45), Max: 98.5 (14 Sep 2024 04:45)  HR: 72 (14 Sep 2024 04:45) (59 - 75)  BP: 105/64 (14 Sep 2024 04:45) (105/64 - 137/81)  BP(mean): --  RR: 17 (14 Sep 2024 04:45) (17 - 18)  SpO2: 96% (14 Sep 2024 04:45) (96% - 97%)    Parameters below as of 14 Sep 2024 04:45  Patient On (Oxygen Delivery Method): room air        PHYSICAL EXAM:  GENERAL: NAD, well-developed  CHEST/LUNG: Clear to auscultation bilaterally; No wheeze  HEART: Regular rate and rhythm; No murmurs, rubs, or gallops  ABDOMEN: Soft, Nontender, Nondistended; Bowel sounds present  EXTREMITIES:  2+ Peripheral Pulses, No clubbing, cyanosis, or edema  PSYCH: AAOx3        LABS:                        13.9   9.90  )-----------( 386      ( 14 Sep 2024 07:09 )             42.1     09-14    140  |  106  |  11  ----------------------------<  123<H>  3.6   |  19<L>  |  0.64    Ca    8.9      14 Sep 2024 07:10    TPro  7.6  /  Alb  3.8  /  TBili  0.8  /  DBili  x   /  AST  40  /  ALT  116<H>  /  AlkPhos  340<H>  09-14    PT/INR - ( 13 Sep 2024 15:47 )   PT: 11.7 sec;   INR: 1.07 ratio         PTT - ( 13 Sep 2024 15:47 )  PTT:34.5 sec      Urinalysis Basic - ( 14 Sep 2024 07:10 )    Color: x / Appearance: x / SG: x / pH: x  Gluc: 123 mg/dL / Ketone: x  / Bili: x / Urobili: x   Blood: x / Protein: x / Nitrite: x   Leuk Esterase: x / RBC: x / WBC x   Sq Epi: x / Non Sq Epi: x / Bacteria: x          RADIOLOGY & ADDITIONAL TESTS:    Imaging Personally Reviewed:    Electrocardiogram Personally Reviewed:    COORDINATION OF CARE:  Care Discussed with Consultants/Other Providers [Y/N]:  Prior or Outpatient Records Reviewed [Y/N]:

## 2024-09-15 DIAGNOSIS — R19.7 DIARRHEA, UNSPECIFIED: ICD-10-CM

## 2024-09-15 LAB
ALBUMIN SERPL ELPH-MCNC: 3.4 G/DL — SIGNIFICANT CHANGE UP (ref 3.3–5)
ALP SERPL-CCNC: 268 U/L — HIGH (ref 40–120)
ALT FLD-CCNC: 87 U/L — HIGH (ref 10–45)
ANION GAP SERPL CALC-SCNC: 13 MMOL/L — SIGNIFICANT CHANGE UP (ref 5–17)
APTT BLD: 30.1 SEC — SIGNIFICANT CHANGE UP (ref 24.5–35.6)
AST SERPL-CCNC: 37 U/L — SIGNIFICANT CHANGE UP (ref 10–40)
BASOPHILS # BLD AUTO: 0.03 K/UL — SIGNIFICANT CHANGE UP (ref 0–0.2)
BASOPHILS NFR BLD AUTO: 0.5 % — SIGNIFICANT CHANGE UP (ref 0–2)
BILIRUB SERPL-MCNC: 0.4 MG/DL — SIGNIFICANT CHANGE UP (ref 0.2–1.2)
BLD GP AB SCN SERPL QL: NEGATIVE — SIGNIFICANT CHANGE UP
BUN SERPL-MCNC: 12 MG/DL — SIGNIFICANT CHANGE UP (ref 7–23)
CALCIUM SERPL-MCNC: 8.6 MG/DL — SIGNIFICANT CHANGE UP (ref 8.4–10.5)
CHLORIDE SERPL-SCNC: 101 MMOL/L — SIGNIFICANT CHANGE UP (ref 96–108)
CO2 SERPL-SCNC: 22 MMOL/L — SIGNIFICANT CHANGE UP (ref 22–31)
CREAT SERPL-MCNC: 0.75 MG/DL — SIGNIFICANT CHANGE UP (ref 0.5–1.3)
EGFR: 83 ML/MIN/1.73M2 — SIGNIFICANT CHANGE UP
EOSINOPHIL # BLD AUTO: 0.43 K/UL — SIGNIFICANT CHANGE UP (ref 0–0.5)
EOSINOPHIL NFR BLD AUTO: 6.7 % — HIGH (ref 0–6)
GAS PNL BLDV: SIGNIFICANT CHANGE UP
GLUCOSE SERPL-MCNC: 107 MG/DL — HIGH (ref 70–99)
HCT VFR BLD CALC: 36.3 % — SIGNIFICANT CHANGE UP (ref 34.5–45)
HGB BLD-MCNC: 12.1 G/DL — SIGNIFICANT CHANGE UP (ref 11.5–15.5)
IMM GRANULOCYTES NFR BLD AUTO: 0.6 % — SIGNIFICANT CHANGE UP (ref 0–0.9)
INR BLD: 1.22 RATIO — HIGH (ref 0.85–1.18)
LYMPHOCYTES # BLD AUTO: 2.35 K/UL — SIGNIFICANT CHANGE UP (ref 1–3.3)
LYMPHOCYTES # BLD AUTO: 36.7 % — SIGNIFICANT CHANGE UP (ref 13–44)
MCHC RBC-ENTMCNC: 28.4 PG — SIGNIFICANT CHANGE UP (ref 27–34)
MCHC RBC-ENTMCNC: 33.3 GM/DL — SIGNIFICANT CHANGE UP (ref 32–36)
MCV RBC AUTO: 85.2 FL — SIGNIFICANT CHANGE UP (ref 80–100)
MONOCYTES # BLD AUTO: 0.83 K/UL — SIGNIFICANT CHANGE UP (ref 0–0.9)
MONOCYTES NFR BLD AUTO: 13 % — SIGNIFICANT CHANGE UP (ref 2–14)
NEUTROPHILS # BLD AUTO: 2.72 K/UL — SIGNIFICANT CHANGE UP (ref 1.8–7.4)
NEUTROPHILS NFR BLD AUTO: 42.5 % — LOW (ref 43–77)
NRBC # BLD: 0 /100 WBCS — SIGNIFICANT CHANGE UP (ref 0–0)
PLATELET # BLD AUTO: 346 K/UL — SIGNIFICANT CHANGE UP (ref 150–400)
POTASSIUM SERPL-MCNC: 3.3 MMOL/L — LOW (ref 3.5–5.3)
POTASSIUM SERPL-SCNC: 3.3 MMOL/L — LOW (ref 3.5–5.3)
PROT SERPL-MCNC: 6.7 G/DL — SIGNIFICANT CHANGE UP (ref 6–8.3)
PROTHROM AB SERPL-ACNC: 12.7 SEC — SIGNIFICANT CHANGE UP (ref 9.5–13)
RBC # BLD: 4.26 M/UL — SIGNIFICANT CHANGE UP (ref 3.8–5.2)
RBC # FLD: 14.7 % — HIGH (ref 10.3–14.5)
RH IG SCN BLD-IMP: POSITIVE — SIGNIFICANT CHANGE UP
SODIUM SERPL-SCNC: 136 MMOL/L — SIGNIFICANT CHANGE UP (ref 135–145)
WBC # BLD: 6.4 K/UL — SIGNIFICANT CHANGE UP (ref 3.8–10.5)
WBC # FLD AUTO: 6.4 K/UL — SIGNIFICANT CHANGE UP (ref 3.8–10.5)

## 2024-09-15 PROCEDURE — 99232 SBSQ HOSP IP/OBS MODERATE 35: CPT

## 2024-09-15 RX ORDER — LOSARTAN POTASSIUM 50 MG/1
25 TABLET ORAL DAILY
Refills: 0 | Status: DISCONTINUED | OUTPATIENT
Start: 2024-09-15 | End: 2024-09-15

## 2024-09-15 RX ORDER — DEXTROSE, SODIUM ACETATE, POTASSIUM CHLORIDE, POTASSIUM PHOSPHATE, AND SODIUM CHLORIDE 5; .15; .13; .28; .091 G/100ML; G/100ML; G/100ML; G/100ML; G/100ML
1000 INJECTION, SOLUTION INTRAVENOUS
Refills: 0 | Status: DISCONTINUED | OUTPATIENT
Start: 2024-09-15 | End: 2024-09-17

## 2024-09-15 RX ORDER — POTASSIUM CHLORIDE 10 MEQ
40 TABLET, EXT RELEASE, PARTICLES/CRYSTALS ORAL EVERY 4 HOURS
Refills: 0 | Status: COMPLETED | OUTPATIENT
Start: 2024-09-15 | End: 2024-09-15

## 2024-09-15 RX ADMIN — MEMANTINE 5 MILLIGRAM(S): 7 CAPSULE, EXTENDED RELEASE ORAL at 17:22

## 2024-09-15 RX ADMIN — PIPERACILLIN SODIUM AND TAZOBACTAM SODIUM 25 GRAM(S): 3; .375 INJECTION, POWDER, FOR SOLUTION INTRAVENOUS at 00:32

## 2024-09-15 RX ADMIN — PIPERACILLIN SODIUM AND TAZOBACTAM SODIUM 25 GRAM(S): 3; .375 INJECTION, POWDER, FOR SOLUTION INTRAVENOUS at 07:20

## 2024-09-15 RX ADMIN — PIPERACILLIN SODIUM AND TAZOBACTAM SODIUM 25 GRAM(S): 3; .375 INJECTION, POWDER, FOR SOLUTION INTRAVENOUS at 15:14

## 2024-09-15 RX ADMIN — Medication 40 MILLIEQUIVALENT(S): at 13:38

## 2024-09-15 RX ADMIN — Medication 40 MILLIEQUIVALENT(S): at 17:22

## 2024-09-15 NOTE — PROGRESS NOTE ADULT - NSPROGADDITIONALINFOA_GEN_ALL_CORE
Reported Diarrhea -->Norovirus and E coli on GI PCR --> cont with supportive care   Potassium repleted          Bri Bryant   Hospitalist   available on TEAMS NOROVIRUS and EAEC in stool       [facility]   available on TEAMS

## 2024-09-15 NOTE — PROVIDER CONTACT NOTE (CRITICAL VALUE NOTIFICATION) - NAME OF MD/NP/PA/DO NOTIFIED:
Place of Service: [facility] [address] [place] [address]    10/27/2023      PATIENT NAME:   Antione Carter  YOB: 1952   MEDICAL RECORD NUMBER:[id number]     Date of Service: 10/27/2023      PERFORMING PROVIDER:   Mikie Zhao MD    Preoperative Diagnosis:   Personal Hx of Colonic Polyps (High risk adenoma in cecum 2016, last c-scope 2018 with no polyps), chest pain    Patient endorses 1 episode of chest pain that happened to him back in September and nothing since then.  Denies any heartburn or dysphagia.  Denies any abdominal surgery.  Patient has family history of colon cancer affecting his father in the 70s.      Post operative Diagnosis:  Hiatal Hernia and submucosal lesion   Polyp(s) 2 and Severe melanosis coli    OPERATIVE PROCEDURE:       ANESTHESIA:   MAC IV    Events:   Event Tracking     Panel 1     Procedure : EGD, WITH BIOPSY      Event Time In    Procedure Start 1204    Procedure End 1208       Procedure : COLONOSCOPY      Event Time In    Procedure Start 1212    Procedure End 1228              Scope In: 1212    At Cecum: 1215   Scope Out: 1228   Scope Withdrawal Time: 13.17    Cecal withdrawal time      minutes    ESTIMATED BLOOD LOSS: <10 ml       Procedure Description:?The patient was placed in the left lateral position and monitored continuously with automatic blood pressure, ECG tracing, pulse oximetry monitoring and direct observations. Bite block placed and oxygen administered by a nasal cannula as needed. Medications were administered incrementally over the course of the procedure to achieve an adequate level of conscious sedation. After adequate sedation, the endoscope was carefully introduced into the oropharynx and passed into the esophagus.? There was normal pharyngeal and laryngeal structure.  The esophagus and gastroesophageal junction were carefully examined. After advancement of the endoscope into the stomach, a careful examination was performed,  including views of the antrum, incisura angularis, corpus and retroflexed views of the cardia and fundus.?   The pylorus was then intubated without any difficulty and the endoscope was advanced to the second portion of the duodenum. Careful examination of the second portion of the duodenum and the bulb was then performed. Findings and intervention are detailed below. The stomach was then decompressed and the endoscope withdrawn.  Overall, the patient tolerated the procedure well without undue discomfort, hypotension or desaturation. The patient recovered in the observation area and was discharged when adequately recovered.?      Findings:     Esophagus:   The Z-line was measured at 41 cm, gastroesophageal junction at 41 cm and hiatus at 42 cm from the incisors.?    Small hiatal hernia about 1 cm noted.  Biopsy was obtained from GE junction to rule out esophagitis.    Normal.  Above GE junction submucosal lesion was noted, 5-6 mm in size, positive for pillow sign, possibly appeared like lipoma.    Stomach:  Normal    Duodenum to second portion:   Normal      Specimens:  See Findings for Specimens      Colonoscopy: The patient was turned in the room and  placed in the left lateral position. A rectal exam was performed The patient was monitored continuously through ECG tracing, pulse oximetry monitoring and direct observations.An Olympus Pediatric Colonoscope: PCF-H190DL was inserted into the rectum and advanced under direct vision to the terminal ileum. The procedure was considered not difficult..      During withdrawal examination, the final quality of the prep was [place] bowel prep scale: Right side: 2- (minor amount of residual staining, small fragments of stool, and\or opaque liquid, but mucosa of colon segment is seen well)  Transverse colon: 2- (minor amount of residual staining, small fragments of stool, and\or opaque liquid, but mucosa of colon segment is seen well)  Left Colon: 2- (minor amount of residual  MARCOS Gunderson staining, small fragments of stool, and\or opaque liquid, but mucosa of colon segment is seen well)    A careful inspection was made as the colonoscope was withdrawn, including a retroflexed view of the rectum; findings and interventions are described below. Appropriate photo documentation was obtained.        Overall Antione Carter tolerated the procedure well, without undue discomfort, hypotension or desaturation. The patient was adequately recovered in the endoscopy suite and was transported to the Post Anesthesia Care Unit.      Findings:  Anorectal: Normal  Terminal ileum: Normal    Cecum: 1 Polyps size ranging from 3-4 mm removed by cold snare polypectomy  and Severe melanosis coli  Ascending colon: Severe melanosis coli  Transverse colon: Severe melanosis coli  Descending colon: 1 Polyps size ranging from 4-5 mm removed by cold snare polypectomy  and Severe melanosis coli  Sigmoid colon: Severe melanosis coli  Rectum: Severe melanosis coli    Complications: none    Impression:  Hiatal Hernia and esophageal submucosal lesion   Polyp(s) 2 and Severe melanosis coli    Specimens:  See Findings for Specimens       Recommendations:  -will schedule patient for EUS for evaluation of esophageal submucosal lesion  -Repeat colonoscopy in 5 years, pending pathology   -Okay to restart home medications (including antiplatelets and anticoagulants if any)  -If you develop severe abdominal pain and overt rectal bleeding. Please go to the nearest ER and bring this report with you   -Please call our surgery center if you develop any complications after the procedure  -Patient might need earlier colonoscopy if there are any new alarming symptoms (change in bowel habit, hematochezia or weight loss)  -Follow up biopsy results  -Follow up with GI clinic

## 2024-09-15 NOTE — PROGRESS NOTE ADULT - SUBJECTIVE AND OBJECTIVE BOX
TRAUMA SURGERY PROGRESS NOTE ([id number])    SUBJECTIVE:  No acute events overnight. Patient reports pain is well controlled.   Denies nausea, vomiting.     OBJECTIVE:  Vital Signs Last 24 Hrs  T(C): 36.6 (15 Sep 2024 11:29), Max: 36.8 (14 Sep 2024 17:29)  T(F): 97.8 (15 Sep 2024 11:29), Max: 98.3 (14 Sep 2024 19:59)  HR: 60 (15 Sep 2024 11:29) (60 - 69)  BP: 100/65 (15 Sep 2024 11:29) (96/54 - 107/65)  BP(mean): --  RR: 18 (15 Sep 2024 11:29) (17 - 18)  SpO2: 96% (15 Sep 2024 11:29) (93% - 96%)    Parameters below as of 15 Sep 2024 11:29  Patient On (Oxygen Delivery Method): room air        Physical Examination:  GEN: NAD, resting quietly  NEURO: AAOx3, CN II-XII grossly intact, no focal deficits  PULM: symmetric chest rise bilaterally, no increased WOB  ABD: soft, RUQ ttp, nondistended  EXTR: no lower extremity edema, moving all extremities

## 2024-09-15 NOTE — PROGRESS NOTE ADULT - SUBJECTIVE AND OBJECTIVE BOX
Patient is a 74y old  Female who presents with a chief complaint of abdominal pain x few days (14 Sep 2024 11:46)      SUBJECTIVE / OVERNIGHT EVENTS:    Tele reviewed:       ADDITIONAL REVIEW OF SYSTEMS: Negative except for above    MEDICATIONS  (STANDING):  folic acid 1 milliGRAM(s) Oral daily  influenza  Vaccine (HIGH DOSE) 0.5 milliLiter(s) IntraMuscular once  lactated ringers. 1000 milliLiter(s) (70 mL/Hr) IV Continuous <Continuous>  levothyroxine 112 MICROGram(s) Oral daily  losartan 25 milliGRAM(s) Oral daily  memantine 5 milliGRAM(s) Oral two times a day  piperacillin/tazobactam IVPB.. 3.375 Gram(s) IV Intermittent every 8 hours  potassium chloride    Tablet ER 40 milliEquivalent(s) Oral every 4 hours    MEDICATIONS  (PRN):  acetaminophen     Tablet .. 650 milliGRAM(s) Oral every 6 hours PRN Temp greater or equal to 38C (100.4F), Mild Pain (1 - 3)  aluminum hydroxide/magnesium hydroxide/simethicone Suspension 30 milliLiter(s) Oral every 4 hours PRN Dyspepsia  melatonin 3 milliGRAM(s) Oral at bedtime PRN Insomnia  ondansetron Injectable 4 milliGRAM(s) IV Push every 8 hours PRN Nausea and/or Vomiting      CAPILLARY BLOOD GLUCOSE        I&O's Summary    14 Sep 2024 07:01  -  15 Sep 2024 07:00  --------------------------------------------------------  IN: 660 mL / OUT: 200 mL / NET: 460 mL        PHYSICAL EXAM:  Vital Signs Last 24 Hrs  T(C): 36.5 (15 Sep 2024 05:07), Max: 36.8 (14 Sep 2024 17:29)  T(F): 97.7 (15 Sep 2024 05:07), Max: 98.3 (14 Sep 2024 19:59)  HR: 60 (15 Sep 2024 05:07) (60 - 89)  BP: 96/54 (15 Sep 2024 05:07) (96/54 - 107/65)  BP(mean): --  RR: 17 (15 Sep 2024 05:07) (17 - 18)  SpO2: 94% (15 Sep 2024 05:07) (93% - 96%)    Parameters below as of 15 Sep 2024 05:07  Patient On (Oxygen Delivery Method): room air        PHYSICAL EXAM:        LABS:                        12.1   6.40  )-----------( 346      ( 15 Sep 2024 04:21 )             36.3     09-15    136  |  101  |  12  ----------------------------<  107<H>  3.3<L>   |  22  |  0.75    Ca    8.6      15 Sep 2024 04:21    TPro  6.7  /  Alb  3.4  /  TBili  0.4  /  DBili  x   /  AST  37  /  ALT  87<H>  /  AlkPhos  268<H>  09-15    PT/INR - ( 15 Sep 2024 04:21 )   PT: 12.7 sec;   INR: 1.22 ratio         PTT - ( 15 Sep 2024 04:21 )  PTT:30.1 sec      Urinalysis Basic - ( 15 Sep 2024 04:21 )    Color: x / Appearance: x / SG: x / pH: x  Gluc: 107 mg/dL / Ketone: x  / Bili: x / Urobili: x   Blood: x / Protein: x / Nitrite: x   Leuk Esterase: x / RBC: x / WBC x   Sq Epi: x / Non Sq Epi: x / Bacteria: x          RADIOLOGY & ADDITIONAL TESTS:    Imaging Personally Reviewed:    Electrocardiogram Personally Reviewed:    COORDINATION OF CARE:  Care Discussed with Consultants/Other Providers [Y/N]:  Prior or Outpatient Records Reviewed [Y/N]:     Patient is a 74y old  Female who presents with a chief complaint of abdominal pain x few days (14 Sep 2024 11:46)      SUBJECTIVE / OVERNIGHT EVENTS:  T Max: 98.3F  2 episodes of watery stools as per patient with no Nausea and no vomiting        MEDICATIONS  (STANDING):  folic acid 1 milliGRAM(s) Oral daily  influenza  Vaccine (HIGH DOSE) 0.5 milliLiter(s) IntraMuscular once  lactated ringers. 1000 milliLiter(s) (70 mL/Hr) IV Continuous <Continuous>  levothyroxine 112 MICROGram(s) Oral daily  losartan 25 milliGRAM(s) Oral daily  memantine 5 milliGRAM(s) Oral two times a day  piperacillin/tazobactam IVPB.. 3.375 Gram(s) IV Intermittent every 8 hours  potassium chloride    Tablet ER 40 milliEquivalent(s) Oral every 4 hours    MEDICATIONS  (PRN):  acetaminophen     Tablet .. 650 milliGRAM(s) Oral every 6 hours PRN Temp greater or equal to 38C (100.4F), Mild Pain (1 - 3)  aluminum hydroxide/magnesium hydroxide/simethicone Suspension 30 milliLiter(s) Oral every 4 hours PRN Dyspepsia  melatonin 3 milliGRAM(s) Oral at bedtime PRN Insomnia  ondansetron Injectable 4 milliGRAM(s) IV Push every 8 hours PRN Nausea and/or Vomiting      CAPILLARY BLOOD GLUCOSE        I&O's Summary    14 Sep 2024 07:01  -  15 Sep 2024 07:00  --------------------------------------------------------  IN: 660 mL / OUT: 200 mL / NET: 460 mL        PHYSICAL EXAM:  Vital Signs Last 24 Hrs  T(C): 36.5 (15 Sep 2024 05:07), Max: 36.8 (14 Sep 2024 17:29)  T(F): 97.7 (15 Sep 2024 05:07), Max: 98.3 (14 Sep 2024 19:59)  HR: 60 (15 Sep 2024 05:07) (60 - 89)  BP: 96/54 (15 Sep 2024 05:07) (96/54 - 107/65)  BP(mean): --  RR: 17 (15 Sep 2024 05:07) (17 - 18)  SpO2: 94% (15 Sep 2024 05:07) (93% - 96%)    Parameters below as of 15 Sep 2024 05:07  Patient On (Oxygen Delivery Method): room air        PHYSICAL EXAM:  GENERAL: NAD, well-developed  CHEST/LUNG: Clear to auscultation bilaterally; No wheeze  HEART: Regular rate and rhythm; No murmurs, rubs, or gallops  ABDOMEN: Soft, pos upper abdominal tenderness with no rebound, Nondistended; Bowel sounds present  EXTREMITIES:  2+ Peripheral Pulses, No clubbing, cyanosis, or edema  PSYCH: AAOx3      LABS:                        12.1   6.40  )-----------( 346      ( 15 Sep 2024 04:21 )             36.3     09-15    136  |  101  |  12  ----------------------------<  107<H>  3.3<L>   |  22  |  0.75    Ca    8.6      15 Sep 2024 04:21    TPro  6.7  /  Alb  3.4  /  TBili  0.4  /  DBili  x   /  AST  37  /  ALT  87<H>  /  AlkPhos  268<H>  09-15    PT/INR - ( 15 Sep 2024 04:21 )   PT: 12.7 sec;   INR: 1.22 ratio         PTT - ( 15 Sep 2024 04:21 )  PTT:30.1 sec      Urinalysis Basic - ( 15 Sep 2024 04:21 )    Color: x / Appearance: x / SG: x / pH: x  Gluc: 107 mg/dL / Ketone: x  / Bili: x / Urobili: x   Blood: x / Protein: x / Nitrite: x   Leuk Esterase: x / RBC: x / WBC x   Sq Epi: x / Non Sq Epi: x / Bacteria: x          RADIOLOGY & ADDITIONAL TESTS:    Imaging Personally Reviewed:    Electrocardiogram Personally Reviewed:    COORDINATION OF CARE:  Care Discussed with Consultants/Other Providers [Y/N]:  Prior or Outpatient Records Reviewed [Y/N]:

## 2024-09-16 LAB
ALBUMIN SERPL ELPH-MCNC: 3.4 G/DL — SIGNIFICANT CHANGE UP (ref 3.3–5)
ALP SERPL-CCNC: 214 U/L — HIGH (ref 40–120)
ALT FLD-CCNC: 65 U/L — HIGH (ref 10–45)
ANION GAP SERPL CALC-SCNC: 10 MMOL/L — SIGNIFICANT CHANGE UP (ref 5–17)
APTT BLD: 31 SEC — SIGNIFICANT CHANGE UP (ref 24.5–35.6)
AST SERPL-CCNC: 25 U/L — SIGNIFICANT CHANGE UP (ref 10–40)
BASOPHILS # BLD AUTO: 0.03 K/UL — SIGNIFICANT CHANGE UP (ref 0–0.2)
BASOPHILS NFR BLD AUTO: 0.5 % — SIGNIFICANT CHANGE UP (ref 0–2)
BILIRUB SERPL-MCNC: 0.4 MG/DL — SIGNIFICANT CHANGE UP (ref 0.2–1.2)
BUN SERPL-MCNC: 7 MG/DL — SIGNIFICANT CHANGE UP (ref 7–23)
CALCIUM SERPL-MCNC: 9.2 MG/DL — SIGNIFICANT CHANGE UP (ref 8.4–10.5)
CHLORIDE SERPL-SCNC: 108 MMOL/L — SIGNIFICANT CHANGE UP (ref 96–108)
CO2 SERPL-SCNC: 22 MMOL/L — SIGNIFICANT CHANGE UP (ref 22–31)
CREAT SERPL-MCNC: 0.65 MG/DL — SIGNIFICANT CHANGE UP (ref 0.5–1.3)
EGFR: 92 ML/MIN/1.73M2 — SIGNIFICANT CHANGE UP
EOSINOPHIL # BLD AUTO: 0.48 K/UL — SIGNIFICANT CHANGE UP (ref 0–0.5)
EOSINOPHIL NFR BLD AUTO: 7.6 % — HIGH (ref 0–6)
GLUCOSE BLDC GLUCOMTR-MCNC: 105 MG/DL — HIGH (ref 70–99)
GLUCOSE BLDC GLUCOMTR-MCNC: 109 MG/DL — HIGH (ref 70–99)
GLUCOSE BLDC GLUCOMTR-MCNC: 110 MG/DL — HIGH (ref 70–99)
GLUCOSE BLDC GLUCOMTR-MCNC: 118 MG/DL — HIGH (ref 70–99)
GLUCOSE BLDC GLUCOMTR-MCNC: 95 MG/DL — SIGNIFICANT CHANGE UP (ref 70–99)
GLUCOSE SERPL-MCNC: 105 MG/DL — HIGH (ref 70–99)
HCT VFR BLD CALC: 37.5 % — SIGNIFICANT CHANGE UP (ref 34.5–45)
HGB BLD-MCNC: 12.2 G/DL — SIGNIFICANT CHANGE UP (ref 11.5–15.5)
IMM GRANULOCYTES NFR BLD AUTO: 0.5 % — SIGNIFICANT CHANGE UP (ref 0–0.9)
INR BLD: 1.1 RATIO — SIGNIFICANT CHANGE UP (ref 0.85–1.18)
LYMPHOCYTES # BLD AUTO: 2.85 K/UL — SIGNIFICANT CHANGE UP (ref 1–3.3)
LYMPHOCYTES # BLD AUTO: 44.9 % — HIGH (ref 13–44)
MAGNESIUM SERPL-MCNC: 2.1 MG/DL — SIGNIFICANT CHANGE UP (ref 1.6–2.6)
MCHC RBC-ENTMCNC: 27.7 PG — SIGNIFICANT CHANGE UP (ref 27–34)
MCHC RBC-ENTMCNC: 32.5 GM/DL — SIGNIFICANT CHANGE UP (ref 32–36)
MCV RBC AUTO: 85 FL — SIGNIFICANT CHANGE UP (ref 80–100)
MONOCYTES # BLD AUTO: 0.83 K/UL — SIGNIFICANT CHANGE UP (ref 0–0.9)
MONOCYTES NFR BLD AUTO: 13.1 % — SIGNIFICANT CHANGE UP (ref 2–14)
NEUTROPHILS # BLD AUTO: 2.13 K/UL — SIGNIFICANT CHANGE UP (ref 1.8–7.4)
NEUTROPHILS NFR BLD AUTO: 33.4 % — LOW (ref 43–77)
NRBC # BLD: 0 /100 WBCS — SIGNIFICANT CHANGE UP (ref 0–0)
PHOSPHATE SERPL-MCNC: 2.8 MG/DL — SIGNIFICANT CHANGE UP (ref 2.5–4.5)
PLATELET # BLD AUTO: 352 K/UL — SIGNIFICANT CHANGE UP (ref 150–400)
POTASSIUM SERPL-MCNC: 4.1 MMOL/L — SIGNIFICANT CHANGE UP (ref 3.5–5.3)
POTASSIUM SERPL-SCNC: 4.1 MMOL/L — SIGNIFICANT CHANGE UP (ref 3.5–5.3)
PROT SERPL-MCNC: 6.6 G/DL — SIGNIFICANT CHANGE UP (ref 6–8.3)
PROTHROM AB SERPL-ACNC: 12.1 SEC — SIGNIFICANT CHANGE UP (ref 9.5–13)
RBC # BLD: 4.41 M/UL — SIGNIFICANT CHANGE UP (ref 3.8–5.2)
RBC # FLD: 14.6 % — HIGH (ref 10.3–14.5)
SODIUM SERPL-SCNC: 140 MMOL/L — SIGNIFICANT CHANGE UP (ref 135–145)
WBC # BLD: 6.35 K/UL — SIGNIFICANT CHANGE UP (ref 3.8–10.5)
WBC # FLD AUTO: 6.35 K/UL — SIGNIFICANT CHANGE UP (ref 3.8–10.5)

## 2024-09-16 PROCEDURE — 99233 SBSQ HOSP IP/OBS HIGH 50: CPT

## 2024-09-16 PROCEDURE — 93010 ELECTROCARDIOGRAM REPORT: CPT

## 2024-09-16 RX ADMIN — Medication 1 MILLIGRAM(S): at 11:29

## 2024-09-16 RX ADMIN — PIPERACILLIN SODIUM AND TAZOBACTAM SODIUM 25 GRAM(S): 3; .375 INJECTION, POWDER, FOR SOLUTION INTRAVENOUS at 08:26

## 2024-09-16 RX ADMIN — DEXTROSE, SODIUM ACETATE, POTASSIUM CHLORIDE, POTASSIUM PHOSPHATE, AND SODIUM CHLORIDE 70 MILLILITER(S): 5; .15; .13; .28; .091 INJECTION, SOLUTION INTRAVENOUS at 20:52

## 2024-09-16 RX ADMIN — PIPERACILLIN SODIUM AND TAZOBACTAM SODIUM 25 GRAM(S): 3; .375 INJECTION, POWDER, FOR SOLUTION INTRAVENOUS at 00:38

## 2024-09-16 RX ADMIN — PIPERACILLIN SODIUM AND TAZOBACTAM SODIUM 25 GRAM(S): 3; .375 INJECTION, POWDER, FOR SOLUTION INTRAVENOUS at 23:18

## 2024-09-16 RX ADMIN — DEXTROSE, SODIUM ACETATE, POTASSIUM CHLORIDE, POTASSIUM PHOSPHATE, AND SODIUM CHLORIDE 70 MILLILITER(S): 5; .15; .13; .28; .091 INJECTION, SOLUTION INTRAVENOUS at 05:10

## 2024-09-16 RX ADMIN — MEMANTINE 5 MILLIGRAM(S): 7 CAPSULE, EXTENDED RELEASE ORAL at 05:10

## 2024-09-16 RX ADMIN — Medication 112 MICROGRAM(S): at 05:10

## 2024-09-16 RX ADMIN — DEXTROSE, SODIUM ACETATE, POTASSIUM CHLORIDE, POTASSIUM PHOSPHATE, AND SODIUM CHLORIDE 70 MILLILITER(S): 5; .15; .13; .28; .091 INJECTION, SOLUTION INTRAVENOUS at 08:26

## 2024-09-16 RX ADMIN — PIPERACILLIN SODIUM AND TAZOBACTAM SODIUM 25 GRAM(S): 3; .375 INJECTION, POWDER, FOR SOLUTION INTRAVENOUS at 16:47

## 2024-09-16 RX ADMIN — DEXTROSE, SODIUM ACETATE, POTASSIUM CHLORIDE, POTASSIUM PHOSPHATE, AND SODIUM CHLORIDE 70 MILLILITER(S): 5; .15; .13; .28; .091 INJECTION, SOLUTION INTRAVENOUS at 00:38

## 2024-09-16 RX ADMIN — MEMANTINE 5 MILLIGRAM(S): 7 CAPSULE, EXTENDED RELEASE ORAL at 16:47

## 2024-09-16 NOTE — PROGRESS NOTE ADULT - SUBJECTIVE AND OBJECTIVE BOX
Patient is a 74y old  Female who presents with a chief complaint of abdominal pain x few days (16 Sep 2024 07:24)      SUBJECTIVE / OVERNIGHT EVENTS: pt still feels abdominal pain, denies cp, sob, chills     MEDICATIONS  (STANDING):  dextrose 5% + sodium chloride 0.45% with potassium chloride 20 mEq/L 1000 milliLiter(s) (70 mL/Hr) IV Continuous <Continuous>  folic acid 1 milliGRAM(s) Oral daily  influenza  Vaccine (HIGH DOSE) 0.5 milliLiter(s) IntraMuscular once  levothyroxine 112 MICROGram(s) Oral daily  memantine 5 milliGRAM(s) Oral two times a day  piperacillin/tazobactam IVPB.. 3.375 Gram(s) IV Intermittent every 8 hours    MEDICATIONS  (PRN):  acetaminophen     Tablet .. 650 milliGRAM(s) Oral every 6 hours PRN Temp greater or equal to 38C (100.4F), Mild Pain (1 - 3)  aluminum hydroxide/magnesium hydroxide/simethicone Suspension 30 milliLiter(s) Oral every 4 hours PRN Dyspepsia  melatonin 3 milliGRAM(s) Oral at bedtime PRN Insomnia  ondansetron Injectable 4 milliGRAM(s) IV Push every 8 hours PRN Nausea and/or Vomiting        CAPILLARY BLOOD GLUCOSE      POCT Blood Glucose.: 109 mg/dL (16 Sep 2024 06:13)  POCT Blood Glucose.: 95 mg/dL (16 Sep 2024 00:53)    I&O's Summary    15 Sep 2024 07:01  -  16 Sep 2024 07:00  --------------------------------------------------------  IN: 100 mL / OUT: 2 mL / NET: 98 mL    16 Sep 2024 07:01  -  16 Sep 2024 11:45  --------------------------------------------------------  IN: 100 mL / OUT: 0 mL / NET: 100 mL        PHYSICAL EXAM:  GENERAL: NAD, well-developed  HEAD:  Atraumatic, Normocephalic  EYES: conjunctiva and sclera clear  NECK: Supple, No JVD  CHEST/LUNG: Clear to auscultation bilaterally; No wheeze  HEART: Regular rate and rhythm; S1S2  ABDOMEN: Soft, Nontender, Nondistended; Bowel sounds present  EXTREMITIES:  2+ Peripheral Pulses, No clubbing, cyanosis, or edema  PSYCH: AAOx3      LABS:                        12.2   6.35  )-----------( 352      ( 16 Sep 2024 05:53 )             37.5     09-16    140  |  108  |  7   ----------------------------<  105<H>  4.1   |  22  |  0.65    Ca    9.2      16 Sep 2024 05:53  Phos  2.8     09-16  Mg     2.1     09-16    TPro  6.6  /  Alb  3.4  /  TBili  0.4  /  DBili  x   /  AST  25  /  ALT  65<H>  /  AlkPhos  214<H>  09-16    PT/INR - ( 16 Sep 2024 05:53 )   PT: 12.1 sec;   INR: 1.10 ratio         PTT - ( 16 Sep 2024 05:53 )  PTT:31.0 sec      Urinalysis Basic - ( 16 Sep 2024 05:53 )    Color: x / Appearance: x / SG: x / pH: x  Gluc: 105 mg/dL / Ketone: x  / Bili: x / Urobili: x   Blood: x / Protein: x / Nitrite: x   Leuk Esterase: x / RBC: x / WBC x   Sq Epi: x / Non Sq Epi: x / Bacteria: x        RADIOLOGY & ADDITIONAL TESTS:    Imaging Personally Reviewed:    Consultant(s) Notes Reviewed:      Care Discussed with Consultants/Other Providers:

## 2024-09-16 NOTE — CONSULT NOTE ADULT - REASON FOR ADMISSION
abdominal pain x few days

## 2024-09-16 NOTE — PROGRESS NOTE ADULT - SUBJECTIVE AND OBJECTIVE BOX
ACS/Trauma Surgery Daily Progress Note  =====================================================    SUBJECTIVE: Patient reports that they're feeling well. Denies fever, chills, N/V. Still reporting RUQ abdominal pain, but only with palpation.     --------------------------------------------------------------------------------------  VITAL SIGNS:  T(C): 36.5 (09-16-24 @ 04:48), Max: 36.6 (09-15-24 @ 11:29)  HR: 50 (09-16-24 @ 05:15) (50 - 60)  BP: 99/45 (09-16-24 @ 05:15) (93/48 - 102/64)  RR: 18 (09-16-24 @ 04:48) (18 - 18)  SpO2: 97% (09-16-24 @ 04:48) (94% - 97%)  --------------------------------------------------------------------------------------    EXAM    General: NAD, resting in bed comfortably  Cardiac: Regular rate, normotensive  Respiratory: Nonlabored respirations, normal cw expansion, on RA  Abdomen: Soft, nondistended, tender to palpation in the RUQ  Extremities: Warm, well perfused  Neuro: AOx3, CNII-XII intact on gross examination    --------------------------------------------------------------------------------------    IMAGING:   MRCP (9/10/24)  FINDINGS:  LOWER CHEST: Within normal limits.    LIVER/BILE DUCTS: Multifocal peribiliary/ductal enhancement with   corresponding restricted diffusion consistent with cholangitis. This   includes an approximately 3 cm area within the right hepatic dome (22:18)   questioned as an indeterminant hypoattenuating lesion on prior CT.    There is no intra or extrahepatic biliary dilatation. The common bile   duct measures4 mm. There is mild concentric wall thickening and   enhancement of the distal common bile duct. There is a 3 mm impacted   stone at the ampulla and a 2 mm stone in the distal CBD.    GALLBLADDER: Cholelithiasis.  SPLEEN: Within normal limits.  PANCREAS: The pancreas enhances homogeneously and is normal in signal.   Main pancreatic duct is normal in caliber.  ADRENALS: Within normal limits.  KIDNEYS/URETERS: Mild bilateral perinephric stranding. Otherwise, within   normal limits.    VISUALIZED PORTIONS:  BOWEL: Within normal limits.  PERITONEUM: No ascites.  VESSELS: The portal vein, splenic vein, and SMV are patent. No splenic   artery or GDA pseudoaneurysm/hemorrhage.  RETROPERITONEUM/LYMPH NODES: A mildly enlarged peripancreatic lymph node  measuring up to 1.0 cm (27:26), likely reactive.  ABDOMINAL WALL: Within normal limits.  BONES: Within normal limits.    IMPRESSION: Ampullary and distal CBD stones. Mild cholangitis with tiny   scattered peripheral associated biliary abscesses.

## 2024-09-16 NOTE — PRE PROCEDURE NOTE - PRE PROCEDURE EVALUATION
PRE OPERATIVE NOTE    Pre-op Diagnosis: choledocholithiasis and cholangitis  Procedure: laparoscopic cholecystectomy   Surgeon: ACS/Trauma                               12.2   6.35  )-----------( 352      ( 16 Sep 2024 05:53 )             37.5     09-16    140  |  108  |  7   ----------------------------<  105<H>  4.1   |  22  |  0.65    Ca    9.2      16 Sep 2024 05:53  Phos  2.8     09-16  Mg     2.1     09-16    TPro  6.6  /  Alb  3.4  /  TBili  0.4  /  DBili  x   /  AST  25  /  ALT  65<H>  /  AlkPhos  214<H>  09-16    LIVER FUNCTIONS - ( 16 Sep 2024 05:53 )  Alb: 3.4 g/dL / Pro: 6.6 g/dL / ALK PHOS: 214 U/L / ALT: 65 U/L / AST: 25 U/L / GGT: x           PT/INR - ( 16 Sep 2024 05:53 )   PT: 12.1 sec;   INR: 1.10 ratio         PTT - ( 16 Sep 2024 05:53 )  PTT:31.0 sec  Urinalysis Basic - ( 16 Sep 2024 05:53 )    Color: x / Appearance: x / SG: x / pH: x  Gluc: 105 mg/dL / Ketone: x  / Bili: x / Urobili: x   Blood: x / Protein: x / Nitrite: x   Leuk Esterase: x / RBC: x / WBC x   Sq Epi: x / Non Sq Epi: x / Bacteria: x      CAPILLARY BLOOD GLUCOSE      POCT Blood Glucose.: 110 mg/dL (16 Sep 2024 12:48)      Type & Screen: 9/15 x2    A/P: 74y Female planned for above procedure  NPO past midnight, except medications  IVF  Pain/nausea control  AM labs  Consent in chart    
Attending Physician: Dr. Chisholm                           Procedure: EGD/ERCP    Indication for Procedure: choledocholithiasis  ________________________________________________________  PAST MEDICAL & SURGICAL HISTORY:  Hypothyroid      HTN (hypertension)      S/P appendectomy      H/O umbilical hernia repair        ALLERGIES:  No Known Allergies    HOME MEDICATIONS:  folic acid:   levothyroxine 112 mcg (0.112 mg) oral tablet: 1 tab(s) orally once a day  losartan 50 mg oral tablet: 1 tab(s) orally once a day  Namenda 5 mg oral tablet: 1 tab(s) orally    AICD/PPM: [ ] yes   [X] no    PERTINENT LAB DATA:                        12.0   9.25  )-----------( 284      ( 12 Sep 2024 05:30 )             36.0     09-12    140  |  105  |  10  ----------------------------<  107<H>  3.8   |  22  |  0.64    Ca    9.0      12 Sep 2024 05:30    TPro  6.7  /  Alb  3.6  /  TBili  0.7  /  DBili  x   /  AST  27  /  ALT  106<H>  /  AlkPhos  231<H>  09-12    PT/INR - ( 12 Sep 2024 05:30 )   PT: 10.9 sec;   INR: 0.99 ratio                     PHYSICAL EXAMINATION:  GENERAL:  No acute distress  HEENT:  NCAT, no scleral icterus  CHEST: no resp distress  HEART:  RRR  ABDOMEN:  Soft, non-tender, non-distended, no masses  EXTREMITIES:  No cyanosis, clubbing, or edema  SKIN:  No rash/erythema/ecchymoses/petechiae/wounds/abscess/warm/dry  NEURO:  Alert and oriented x 3, no asterixis, no tremor        COMMENTS:  Discussed with patient/HCP risks of endoscopy/colonoscopy which include but are not limited to: risks of perforation, infection and bleeding. Risks also include risks of missed lesions. Patient/HCP would like to proceed at this time.     The patient is a suitable candidate for the planned procedure unless box checked [ ]  No, explain:      
Preop Diagnosis: choledocho  Planned Procedure: lap salas       Vital Signs Last 24 Hrs  T(C): 36.5 (15 Sep 2024 05:07), Max: 36.8 (14 Sep 2024 17:29)  T(F): 97.7 (15 Sep 2024 05:07), Max: 98.3 (14 Sep 2024 19:59)  HR: 60 (15 Sep 2024 05:07) (60 - 89)  BP: 96/54 (15 Sep 2024 05:07) (96/54 - 107/65)  BP(mean): --  RR: 17 (15 Sep 2024 05:07) (17 - 18)  SpO2: 94% (15 Sep 2024 05:07) (93% - 96%)    Parameters below as of 15 Sep 2024 05:07  Patient On (Oxygen Delivery Method): room air      Daily     Daily     Pertinent Labs:                        12.1   6.40  )-----------( 346      ( 15 Sep 2024 04:21 )             36.3     09-15    136  |  101  |  12  ----------------------------<  107<H>  3.3<L>   |  22  |  0.75    Ca    8.6      15 Sep 2024 04:21    TPro  6.7  /  Alb  3.4  /  TBili  0.4  /  DBili  x   /  AST  37  /  ALT  87<H>  /  AlkPhos  268<H>  09-15    PT/INR - ( 15 Sep 2024 04:21 )   PT: 12.7 sec;   INR: 1.22 ratio         PTT - ( 15 Sep 2024 04:21 )  PTT:30.1 sec  ------------------------------------------------------------------------------------------------------------------  Type and Screen: Type + Screen (09.15.24 @ 04:26)    ABO Interpretation: B   Rh Interpretation: Positive   Antibody Screen: Negative      ------------------------------------------------------------------------------------------------------------------  U/A: none  ------------------------------------------------------------------------------------------------------------------  uHCG: na  ------------------------------------------------------------------------------------------------------------------  CXR: none  ------------------------------------------------------------------------------------------------------------------  EKG: in chart  ------------------------------------------------------------------------------------------------------------------  Echo: none  ------------------------------------------------------------------------------------------------------------------    Assessment:  74F PMH HTN, hypothyroidism post procedure day 1 from ERCP for choledocholithiasis and cholangitis. General surgery consulted for possible inpatient cholecystectomy, planning for OR today for lap salas   Plan:  - OR today for lap salas  - NPO after midnight, except medications   - IVF while NPO  - Consent signed in chart  - Pre-op Labs at 4:00 am on /: CBC, BMP, Mag, Phos, PT/PTT/INR, T&S - resulted    Surgery  [id number]

## 2024-09-17 ENCOUNTER — TRANSCRIPTION ENCOUNTER (OUTPATIENT)
Age: 74
End: 2024-09-17

## 2024-09-17 VITALS
HEART RATE: 45 BPM | RESPIRATION RATE: 18 BRPM | OXYGEN SATURATION: 96 % | SYSTOLIC BLOOD PRESSURE: 124 MMHG | DIASTOLIC BLOOD PRESSURE: 78 MMHG | TEMPERATURE: 98 F

## 2024-09-17 LAB
ALBUMIN SERPL ELPH-MCNC: 3.4 G/DL — SIGNIFICANT CHANGE UP (ref 3.3–5)
ALP SERPL-CCNC: 182 U/L — HIGH (ref 40–120)
ALT FLD-CCNC: 49 U/L — HIGH (ref 10–45)
ANION GAP SERPL CALC-SCNC: 10 MMOL/L — SIGNIFICANT CHANGE UP (ref 5–17)
APTT BLD: 29.9 SEC — SIGNIFICANT CHANGE UP (ref 24.5–35.6)
AST SERPL-CCNC: 17 U/L — SIGNIFICANT CHANGE UP (ref 10–40)
BILIRUB SERPL-MCNC: 0.3 MG/DL — SIGNIFICANT CHANGE UP (ref 0.2–1.2)
BLD GP AB SCN SERPL QL: NEGATIVE — SIGNIFICANT CHANGE UP
BUN SERPL-MCNC: 6 MG/DL — LOW (ref 7–23)
CALCIUM SERPL-MCNC: 8.9 MG/DL — SIGNIFICANT CHANGE UP (ref 8.4–10.5)
CHLORIDE SERPL-SCNC: 107 MMOL/L — SIGNIFICANT CHANGE UP (ref 96–108)
CO2 SERPL-SCNC: 23 MMOL/L — SIGNIFICANT CHANGE UP (ref 22–31)
CREAT SERPL-MCNC: 0.69 MG/DL — SIGNIFICANT CHANGE UP (ref 0.5–1.3)
EGFR: 90 ML/MIN/1.73M2 — SIGNIFICANT CHANGE UP
GLUCOSE BLDC GLUCOMTR-MCNC: 107 MG/DL — HIGH (ref 70–99)
GLUCOSE BLDC GLUCOMTR-MCNC: 113 MG/DL — HIGH (ref 70–99)
GLUCOSE SERPL-MCNC: 110 MG/DL — HIGH (ref 70–99)
HCT VFR BLD CALC: 36.5 % — SIGNIFICANT CHANGE UP (ref 34.5–45)
HGB BLD-MCNC: 11.8 G/DL — SIGNIFICANT CHANGE UP (ref 11.5–15.5)
INR BLD: 1.2 RATIO — HIGH (ref 0.85–1.18)
MAGNESIUM SERPL-MCNC: 2.2 MG/DL — SIGNIFICANT CHANGE UP (ref 1.6–2.6)
MCHC RBC-ENTMCNC: 28.1 PG — SIGNIFICANT CHANGE UP (ref 27–34)
MCHC RBC-ENTMCNC: 32.3 GM/DL — SIGNIFICANT CHANGE UP (ref 32–36)
MCV RBC AUTO: 86.9 FL — SIGNIFICANT CHANGE UP (ref 80–100)
NRBC # BLD: 0 /100 WBCS — SIGNIFICANT CHANGE UP (ref 0–0)
PHOSPHATE SERPL-MCNC: 3.5 MG/DL — SIGNIFICANT CHANGE UP (ref 2.5–4.5)
PLATELET # BLD AUTO: 357 K/UL — SIGNIFICANT CHANGE UP (ref 150–400)
POTASSIUM SERPL-MCNC: 4 MMOL/L — SIGNIFICANT CHANGE UP (ref 3.5–5.3)
POTASSIUM SERPL-SCNC: 4 MMOL/L — SIGNIFICANT CHANGE UP (ref 3.5–5.3)
PROT SERPL-MCNC: 6.4 G/DL — SIGNIFICANT CHANGE UP (ref 6–8.3)
PROTHROM AB SERPL-ACNC: 12.5 SEC — SIGNIFICANT CHANGE UP (ref 9.5–13)
RBC # BLD: 4.2 M/UL — SIGNIFICANT CHANGE UP (ref 3.8–5.2)
RBC # FLD: 14.6 % — HIGH (ref 10.3–14.5)
RH IG SCN BLD-IMP: POSITIVE — SIGNIFICANT CHANGE UP
SODIUM SERPL-SCNC: 140 MMOL/L — SIGNIFICANT CHANGE UP (ref 135–145)
SURGICAL PATHOLOGY STUDY: SIGNIFICANT CHANGE UP
WBC # BLD: 6.21 K/UL — SIGNIFICANT CHANGE UP (ref 3.8–10.5)
WBC # FLD AUTO: 6.21 K/UL — SIGNIFICANT CHANGE UP (ref 3.8–10.5)

## 2024-09-17 PROCEDURE — 83605 ASSAY OF LACTIC ACID: CPT

## 2024-09-17 PROCEDURE — A9585: CPT

## 2024-09-17 PROCEDURE — 80074 ACUTE HEPATITIS PANEL: CPT

## 2024-09-17 PROCEDURE — 86850 RBC ANTIBODY SCREEN: CPT

## 2024-09-17 PROCEDURE — 82962 GLUCOSE BLOOD TEST: CPT

## 2024-09-17 PROCEDURE — 74183 MRI ABD W/O CNTR FLWD CNTR: CPT | Mod: MC

## 2024-09-17 PROCEDURE — 86038 ANTINUCLEAR ANTIBODIES: CPT

## 2024-09-17 PROCEDURE — 86901 BLOOD TYPING SEROLOGIC RH(D): CPT

## 2024-09-17 PROCEDURE — 88342 IMHCHEM/IMCYTCHM 1ST ANTB: CPT

## 2024-09-17 PROCEDURE — 85025 COMPLETE CBC W/AUTO DIFF WBC: CPT

## 2024-09-17 PROCEDURE — 83690 ASSAY OF LIPASE: CPT

## 2024-09-17 PROCEDURE — 86900 BLOOD TYPING SEROLOGIC ABO: CPT

## 2024-09-17 PROCEDURE — 80053 COMPREHEN METABOLIC PANEL: CPT

## 2024-09-17 PROCEDURE — 80061 LIPID PANEL: CPT

## 2024-09-17 PROCEDURE — 83521 IG LIGHT CHAINS FREE EACH: CPT

## 2024-09-17 PROCEDURE — 82105 ALPHA-FETOPROTEIN SERUM: CPT

## 2024-09-17 PROCEDURE — 82784 ASSAY IGA/IGD/IGG/IGM EACH: CPT

## 2024-09-17 PROCEDURE — 87507 IADNA-DNA/RNA PROBE TQ 12-25: CPT

## 2024-09-17 PROCEDURE — 80307 DRUG TEST PRSMV CHEM ANLYZR: CPT

## 2024-09-17 PROCEDURE — 36415 COLL VENOUS BLD VENIPUNCTURE: CPT

## 2024-09-17 PROCEDURE — 82378 CARCINOEMBRYONIC ANTIGEN: CPT

## 2024-09-17 PROCEDURE — 83735 ASSAY OF MAGNESIUM: CPT

## 2024-09-17 PROCEDURE — 93005 ELECTROCARDIOGRAM TRACING: CPT

## 2024-09-17 PROCEDURE — 96374 THER/PROPH/DIAG INJ IV PUSH: CPT

## 2024-09-17 PROCEDURE — 82803 BLOOD GASES ANY COMBINATION: CPT

## 2024-09-17 PROCEDURE — C9399: CPT

## 2024-09-17 PROCEDURE — 86255 FLUORESCENT ANTIBODY SCREEN: CPT

## 2024-09-17 PROCEDURE — 84132 ASSAY OF SERUM POTASSIUM: CPT

## 2024-09-17 PROCEDURE — 85027 COMPLETE CBC AUTOMATED: CPT

## 2024-09-17 PROCEDURE — 84100 ASSAY OF PHOSPHORUS: CPT

## 2024-09-17 PROCEDURE — 99231 SBSQ HOSP IP/OBS SF/LOW 25: CPT | Mod: GC

## 2024-09-17 PROCEDURE — 82435 ASSAY OF BLOOD CHLORIDE: CPT

## 2024-09-17 PROCEDURE — 88305 TISSUE EXAM BY PATHOLOGIST: CPT

## 2024-09-17 PROCEDURE — 86381 MITOCHONDRIAL ANTIBODY EACH: CPT

## 2024-09-17 PROCEDURE — 83036 HEMOGLOBIN GLYCOSYLATED A1C: CPT

## 2024-09-17 PROCEDURE — 84443 ASSAY THYROID STIM HORMONE: CPT

## 2024-09-17 PROCEDURE — 84484 ASSAY OF TROPONIN QUANT: CPT

## 2024-09-17 PROCEDURE — 85014 HEMATOCRIT: CPT

## 2024-09-17 PROCEDURE — 85610 PROTHROMBIN TIME: CPT

## 2024-09-17 PROCEDURE — 84295 ASSAY OF SERUM SODIUM: CPT

## 2024-09-17 PROCEDURE — 85018 HEMOGLOBIN: CPT

## 2024-09-17 PROCEDURE — 82947 ASSAY GLUCOSE BLOOD QUANT: CPT

## 2024-09-17 PROCEDURE — C1769: CPT

## 2024-09-17 PROCEDURE — 96375 TX/PRO/DX INJ NEW DRUG ADDON: CPT

## 2024-09-17 PROCEDURE — 84439 ASSAY OF FREE THYROXINE: CPT

## 2024-09-17 PROCEDURE — 82787 IGG 1 2 3 OR 4 EACH: CPT

## 2024-09-17 PROCEDURE — 99233 SBSQ HOSP IP/OBS HIGH 50: CPT

## 2024-09-17 PROCEDURE — C1889: CPT

## 2024-09-17 PROCEDURE — 88341 IMHCHEM/IMCYTCHM EA ADD ANTB: CPT

## 2024-09-17 PROCEDURE — 85730 THROMBOPLASTIN TIME PARTIAL: CPT

## 2024-09-17 PROCEDURE — 74177 CT ABD & PELVIS W/CONTRAST: CPT | Mod: MC

## 2024-09-17 PROCEDURE — 86301 IMMUNOASSAY TUMOR CA 19-9: CPT

## 2024-09-17 PROCEDURE — 82330 ASSAY OF CALCIUM: CPT

## 2024-09-17 PROCEDURE — 99285 EMERGENCY DEPT VISIT HI MDM: CPT

## 2024-09-17 PROCEDURE — 76700 US EXAM ABDOM COMPLETE: CPT

## 2024-09-17 RX ORDER — AMOXICILLIN AND CLAVULANATE POTASSIUM 250; 125 MG/1; MG/1
1 TABLET, FILM COATED ORAL
Qty: 7 | Refills: 0
Start: 2024-09-17 | End: 2024-09-19

## 2024-09-17 RX ADMIN — DEXTROSE, SODIUM ACETATE, POTASSIUM CHLORIDE, POTASSIUM PHOSPHATE, AND SODIUM CHLORIDE 70 MILLILITER(S): 5; .15; .13; .28; .091 INJECTION, SOLUTION INTRAVENOUS at 09:31

## 2024-09-17 RX ADMIN — PIPERACILLIN SODIUM AND TAZOBACTAM SODIUM 25 GRAM(S): 3; .375 INJECTION, POWDER, FOR SOLUTION INTRAVENOUS at 09:32

## 2024-09-17 RX ADMIN — Medication 112 MICROGRAM(S): at 05:37

## 2024-09-17 RX ADMIN — MEMANTINE 5 MILLIGRAM(S): 7 CAPSULE, EXTENDED RELEASE ORAL at 05:37

## 2024-09-17 RX ADMIN — PIPERACILLIN SODIUM AND TAZOBACTAM SODIUM 25 GRAM(S): 3; .375 INJECTION, POWDER, FOR SOLUTION INTRAVENOUS at 16:42

## 2024-09-17 NOTE — PROVIDER CONTACT NOTE (OTHER) - ACTION/TREATMENT ORDERED:
Pepcid 20mg  IVP x 1 dose and give PRN zofran
surgery team should be aware of net and hernia hx. will ask them to further clarify in morning rounds with pt
no new orders at this time

## 2024-09-17 NOTE — PROGRESS NOTE ADULT - PROBLEM SELECTOR PROBLEM 5
Hypothyroidism
Hypothyroidism
Hypokalemia
Hypothyroidism
Hypothyroidism
HTN (hypertension)
Hypokalemia
Hypokalemia

## 2024-09-17 NOTE — CHART NOTE - NSCHARTNOTEFT_GEN_A_CORE
Went to update patient that her surgery which was scheduled for this afternoon has to be delayed due to an emergency case in the OR.  Patient upset and frustrated that she has been waiting for several days and keeps getting delayed. I explained to patient that these are unforseen circumstances in the OR that we cannot control.   At this time, patient wishes to leave the hospital and consider outpatient cholecystectomy.  I discussed with patient that after having cholangitis, the recommendation is for cholecystectomy during the same admission given high risk of recurrence. Patient demonstrated understanding but still wishes to leave tonight.  I discussed return precautions with her including PO intolerance, nausea, vomiting, RUQ pain, fevers.  All questions answered.    Medicine team updated.    IVON Landeros, PGY-5  Acute Care Surgery [id number]

## 2024-09-17 NOTE — PROGRESS NOTE ADULT - PROVIDER SPECIALTY LIST ADULT
Surgery
Gastroenterology
Hospitalist
Hospitalist
Cardiology
Hospitalist
Surgery
Surgery
Hospitalist

## 2024-09-17 NOTE — PROGRESS NOTE ADULT - PROBLEM SELECTOR PLAN 7
- HOLD  losartan 50mg--> BP is soft --> RESTART once appropriate   Monitor BP
-c/w memantine
- HOLD  losartan 50mg--> BP is soft --> RESTART once appropriate   Monitor BP
-c/w memantine
I personally provided 5 minutes of smoking cessation counseling, risk/harm of continued smoking and benefits of quitting discussed Patient declined to use nicotine patch while inpatient
- HOLD  losartan 50mg--> BP is soft --> RESTART once appropriate   Monitor BP

## 2024-09-17 NOTE — PROGRESS NOTE ADULT - PROBLEM SELECTOR PROBLEM 6
History of memory loss
Hypothyroidism
Hypothyroidism
HTN (hypertension)
Hypothyroidism

## 2024-09-17 NOTE — PROVIDER CONTACT NOTE (OTHER) - REASON
pt asking for clarification on lap salas. pt states she has net from previous hernia and cannot have laparoscopic procedure
pt HR this morning 45 and /56.
Pt stated " I have diarrhea 3x since 7PM " Pt with 150ml of emesis, green in color. C/O nausea

## 2024-09-17 NOTE — DISCHARGE NOTE NURSING/CASE MANAGEMENT/SOCIAL WORK - NSDCFUADDAPPT_GEN_ALL_CORE_FT
APPTS ARE READY TO BE MADE: [X] YES    Best Family or Patient Contact (if needed):    Additional Information about above appointments (if needed):    1: Follow up PCP Dr. Enrique   2: Follow up with Cards Dr. Avila   3: Follow up with surgery Dr. Ac outpt within 1-3 days - Call [phone] to arrange at [address] [place] [address], [address] [facility], [place] [address]    Other comments or requests:

## 2024-09-17 NOTE — PROGRESS NOTE ADULT - PROBLEM SELECTOR PROBLEM 8
History of memory loss
Nicotine dependence
Prophylactic measure
History of memory loss
Nicotine dependence
History of memory loss

## 2024-09-17 NOTE — DISCHARGE NOTE NURSING/CASE MANAGEMENT/SOCIAL WORK - PATIENT PORTAL LINK FT
You can access the FollowMyHealth Patient Portal offered by [facility] by registering at the following website: [email or web address]. By joining [employer]’s FollowMyHealth portal, you will also be able to view your health information using other applications (apps) compatible with our system.

## 2024-09-17 NOTE — PROGRESS NOTE ADULT - SUBJECTIVE AND OBJECTIVE BOX
DATE OF SERVICE: 09-17-24 @ 10:16    Patient is a 74y old  Female who presents with a chief complaint of abdominal pain x few days (16 Sep 2024 18:03)      INTERVAL HISTORY: In no acute distress.     REVIEW OF SYSTEMS:  CONSTITUTIONAL: No weakness  EYES/ENT: No visual changes;  No throat pain   NECK: No pain or stiffness  RESPIRATORY: No cough, wheezing; No shortness of breath  CARDIOVASCULAR: No chest pain or palpitations  GASTROINTESTINAL: No abdominal  pain. No nausea, vomiting, or hematemesis  GENITOURINARY: No dysuria, frequency or hematuria  NEUROLOGICAL: No stroke like symptoms  SKIN: No rashes    	  MEDICATIONS:        PHYSICAL EXAM:  T(C): 36.3 (09-17-24 @ 05:01), Max: 36.4 (09-16-24 @ 12:02)  HR: 45 (09-17-24 @ 05:01) (45 - 54)  BP: 102/56 (09-17-24 @ 05:01) (102/56 - 137/72)  RR: 18 (09-17-24 @ 05:01) (18 - 18)  SpO2: 95% (09-17-24 @ 05:01) (95% - 96%)  Wt(kg): --  I&O's Summary    16 Sep 2024 07:01  -  17 Sep 2024 07:00  --------------------------------------------------------  IN: 870 mL / OUT: 0 mL / NET: 870 mL    17 Sep 2024 07:01  -  17 Sep 2024 10:16  --------------------------------------------------------  IN: 100 mL / OUT: 0 mL / NET: 100 mL          Appearance: In no distress	  HEENT:    PERRL, EOMI	  Cardiovascular:  S1 S2, No JVD  Respiratory: Lungs clear to auscultation	  Gastrointestinal:  Soft, Non-tender, + BS	  Vascularature:  No edema of LE  Psychiatric: Appropriate affect   Neuro: no acute focal deficits                               11.8   6.21  )-----------( 357      ( 17 Sep 2024 05:51 )             36.5     09-17    140  |  107  |  6[L]  ----------------------------<  110[H]  4.0   |  23  |  0.69    Ca    8.9      17 Sep 2024 05:51  Phos  3.5     09-17  Mg     2.2     09-17    TPro  6.4  /  Alb  3.4  /  TBili  0.3  /  DBili  x   /  AST  17  /  ALT  49[H]  /  AlkPhos  182[H]  09-17        Labs personally reviewed      ASSESSMENT/PLAN: 	    74  F  hypothyroidism , HTN , presents for abdominal pain over the past few days found to have cholangitis and choledocholithiasis , S/P ERCP and is awaiting cholecystectomy.        Problem/Plan - 1:  ·  Problem: Preop Risk stratification  ·  Plan: Denies cardiac hx  - Reports good functional capacity  - Non ischemic EKG  - Mod risk for mod risk surgery, no cardiac contraindication to proceed    Problem/Plan - 2:  ·  Problem: Sinus catracho  ·  Plan: Asymptomatic, monitor vitals, off AV node blockers    Problem/Plan - 3:  ·  Problem: HTN (hypertension).   ·  Plan: - HOLD  losartan 50mg--> BP is soft so holding     Problem/Plan - 4:  ·  Problem: Prophylactic measure.   ·  Plan; cont with SCD         GANESH Ordoñez-MARCOS Avila DO [facility]  Cardiovascular Medicine  [address], [address]  Available through call or text on [employer]  Office: [phone]   DATE OF SERVICE: 09-17-24 @ 10:16    Patient is a 74y old  Female who presents with a chief complaint of abdominal pain x few days (16 Sep 2024 18:03)      INTERVAL HISTORY: In no acute distress.     REVIEW OF SYSTEMS:  CONSTITUTIONAL: No weakness  EYES/ENT: No visual changes;  No throat pain   NECK: No pain or stiffness  RESPIRATORY: No cough, wheezing; No shortness of breath  CARDIOVASCULAR: No chest pain or palpitations  GASTROINTESTINAL: No abdominal  pain. No nausea, vomiting, or hematemesis  GENITOURINARY: No dysuria, frequency or hematuria  NEUROLOGICAL: No stroke like symptoms  SKIN: No rashes    	  MEDICATIONS:        PHYSICAL EXAM:  T(C): 36.3 (09-17-24 @ 05:01), Max: 36.4 (09-16-24 @ 12:02)  HR: 45 (09-17-24 @ 05:01) (45 - 54)  BP: 102/56 (09-17-24 @ 05:01) (102/56 - 137/72)  RR: 18 (09-17-24 @ 05:01) (18 - 18)  SpO2: 95% (09-17-24 @ 05:01) (95% - 96%)  Wt(kg): --  I&O's Summary    16 Sep 2024 07:01  -  17 Sep 2024 07:00  --------------------------------------------------------  IN: 870 mL / OUT: 0 mL / NET: 870 mL    17 Sep 2024 07:01  -  17 Sep 2024 10:16  --------------------------------------------------------  IN: 100 mL / OUT: 0 mL / NET: 100 mL          Appearance: In no distress	  HEENT:    PERRL, EOMI	  Cardiovascular:  S1 S2, No JVD  Respiratory: Lungs clear to auscultation	  Gastrointestinal:  Soft, Non-tender, + BS	  Vascularature:  No edema of LE  Psychiatric: Appropriate affect   Neuro: no acute focal deficits                               11.8   6.21  )-----------( 357      ( 17 Sep 2024 05:51 )             36.5     09-17    140  |  107  |  6[L]  ----------------------------<  110[H]  4.0   |  23  |  0.69    Ca    8.9      17 Sep 2024 05:51  Phos  3.5     09-17  Mg     2.2     09-17    TPro  6.4  /  Alb  3.4  /  TBili  0.3  /  DBili  x   /  AST  17  /  ALT  49[H]  /  AlkPhos  182[H]  09-17        Labs personally reviewed      ASSESSMENT/PLAN: 	    74  F  hypothyroidism , HTN , presents for abdominal pain over the past few days found to have cholangitis and choledocholithiasis , S/P ERCP and is awaiting cholecystectomy.        Problem/Plan - 1:  ·  Problem: Preop Risk stratification  ·  Plan: Denies cardiac hx  - Reports good functional capacity  - Non ischemic EKG  - Mod risk for mod risk surgery, no cardiac contraindication to proceed    Problem/Plan - 2:  ·  Problem: Sinus catracho  ·  Plan: Asymptomatic, monitor vitals, off AV node blockers    Problem/Plan - 3:  ·  Problem: HTN (hypertension).   ·  Plan: - HOLD  losartan 50mg--> BP is soft so holding     Problem/Plan - 4:  ·  Problem: Prophylactic measure.   ·  Plan; cont with SCD               GANESH Ordoñez-MARCOS Avila DO [facility]  Cardiovascular Medicine  [address], [address]  Available through call or text on [employer] TE[employer]  Office: [phone]

## 2024-09-17 NOTE — DISCHARGE NOTE PROVIDER - CARE PROVIDER_API CALL
Raman Enrique  Family Medicine  [address]  [place] [address]  Phone: [phone]  Fax: [phone]  Follow Up Time: 1-3 days    Al Avila  Cardiovascular Disease  [address] [facility] [facility], [address]  [place] [address]  Phone: [phone]  Fax: [phone]  Follow Up Time: 2 weeks    Rufino Ac  Surgery  [address], [address]  [place] [address]  Phone: [phone]  Fax: [phone]  Follow Up Time: 1-3 days

## 2024-09-17 NOTE — PROVIDER CONTACT NOTE (OTHER) - BACKGROUND
pt admitted for abdominal pain. pt had cardiology consult yesterday
Pt admitted for abdominal pain
pt admitted for abdominal pain.

## 2024-09-17 NOTE — PROGRESS NOTE ADULT - PROBLEM SELECTOR PLAN 2
- Pt with biliary abscesses on imaging   - Start IV zosyn  - Trend wbc and temp curve   - GI follow up
- Pt with biliary abscesses on imaging   - C/w IV zosyn  - Trend wbc and temp curve   - GI follow up  - see above
- Pt with biliary abscesses on imaging   - C/w IV zosyn  - Can be discharged on augmentin for total 10 day course   - Trend wbc and temp curve   - GI follow up
- Pt with biliary abscesses on imaging   - C/w IV zosyn  - Trend wbc and temp curve   - GI follow up
- cholelithiasis, no cholecystitis   - acute hep panel   - trend liver tests   - MR hepatic protocol
- Pt with biliary abscesses on imaging   - C/w IV zosyn  - Trend wbc and temp curve   - GI follow up  - see above
- Pt with biliary abscesses on imaging   - C/w IV zosyn  - Trend wbc and temp curve   - GI follow up
- Pt with biliary abscesses on imaging   - C/w IV zosyn  - Trend wbc and temp curve   - GI follow up  - see above

## 2024-09-17 NOTE — PROGRESS NOTE ADULT - PROBLEM SELECTOR PLAN 8
I personally provided 5 minutes of smoking cessation counseling, risk/harm of continued smoking and benefits of quitting discussed Patient declined to use nicotine patch while inpatient
-c/w memantine
-c/w memantine
Previous provider reported  provided 5 minutes of smoking cessation counseling, risk/harm of continued smoking and benefits of quitting discussed Patient declined to use nicotine patch while inpatient
D/w daughter 9/10 updated on full plan of care
-c/w memantine

## 2024-09-17 NOTE — DISCHARGE NOTE PROVIDER - NSDCCPCAREPLAN_GEN_ALL_CORE_FT
PRINCIPAL DISCHARGE DIAGNOSIS  Diagnosis: Choledocholithiasis  Assessment and Plan of Treatment: follow up with surgery Dr. Ac outpt for libby marina - Call [phone] to arrange at [address] [place] [address], [address], [place],   diet as tolerated  if pain/symptoms worsen contact provider  continue with augmentin as prescribed to complete course      SECONDARY DISCHARGE DIAGNOSES  Diagnosis: HTN (hypertension)  Assessment and Plan of Treatment: losartan held given soft BP- do not resume unless advised per PCP    Diagnosis: Bradycardia  Assessment and Plan of Treatment: follow up with cardiology outpt

## 2024-09-17 NOTE — PROGRESS NOTE ADULT - PROBLEM SELECTOR PROBLEM 3
Elevated liver enzymes
Hypokalemia
Acute diarrhea
Elevated liver enzymes
Acute diarrhea
Elevated liver enzymes
Acute diarrhea
Elevated liver enzymes

## 2024-09-17 NOTE — DISCHARGE NOTE PROVIDER - NSDCMRMEDTOKEN_GEN_ALL_CORE_FT
amoxicillin-clavulanate 875 mg-125 mg oral tablet: 1 tab(s) orally 2 times a day Take first dose tonight 9/17. End date 9/20/24  folic acid:   levothyroxine 112 mcg (0.112 mg) oral tablet: 1 tab(s) orally once a day  Namenda 5 mg oral tablet: 1 tab(s) orally

## 2024-09-17 NOTE — PROGRESS NOTE ADULT - PROBLEM SELECTOR PLAN 1
- Pt with ampullary and distal CBD stones on imaging   - Plan for ERCP today   - Pain control with tylenol prn   - Nausea/vomiting relief with zofran prn   - F/u IgG4 subset   - F/u AMA, JUSTO, smooth muscle antibody, immunoglobulins   - F/u tumor markers   - F/u acute hepatitis panel   - Continue to trend CMP and CBC  - GI follow up
- Pt with ampullary and distal CBD stones on imaging   - S/p ERCP 9/13 with 2 stones removed; no stent placed   - Diet advanced   - D/w GI 9/13 no further recs   - D/w sx 9/13 pending recs regarding inpatient vs outpatient cholecystectomy   - +JUSTO non specific   - AMA, smooth muscle antibody, immunoglobulins negative   - Tumor markers negative    - Acute hepatitis panel negative   - Continue to trend CMP and CBC
- Pt with ampullary and distal CBD stones on imaging   - D/w GI pending recs  - Pain control with tylenol prn   - Nausea/vomiting relief with zofran prn   - F/u IgG4 subset   - F/u AMA, JUSTO, smooth muscle antibody, immunoglobulins   - F/u tumor markers   - F/u acute hepatitis panel   - Continue to trend CMP and CBC
- Pt with ampullary and distal CBD stones on imaging   - S/p ERCP 9/13 with 2 stones removed; no stent placed   - Previous provider D/w GI 9/13 no further recs   - Surgery team has seen pt and might plan for cholecystectomy either on 9/15 or next week , and recommend to cont monitoring LFT/ Pt is currently NPO   - +JUSTO non specific   - AMA, smooth muscle antibody, immunoglobulins negative   - Tumor markers negative    - Acute hepatitis panel negative   - Continue to trend CMP and CBC
- lipase >180 , symptoms suggestive of pancreatitis ,  however pancreas wnl limits on imaging , possible passed stone iso liver enzyme elevation , currently afebrile , normotensive   - continue IV hydration   - advance diet as tolerated   - pain control as needed , Tylenol PRN   - MRI abdomen   - GI eval
- Pt with ampullary and distal CBD stones on imaging   - S/p ERCP 9/13 with 2 stones removed; no stent placed   - D/w GI 9/13 no further recs   - D/w surgery 9/16 plan for OR today for cholecystectomy   - +JUSTO non specific   - AMA, smooth muscle antibody, immunoglobulins negative   - Tumor markers negative    - Acute hepatitis panel negative   - Continue to trend CMP and CBC
- Pt with ampullary and distal CBD stones on imaging   - S/p ERCP 9/13 with 2 stones removed; no stent placed   - D/w GI 9/13 no further recs   - D/w surgery plan for OR today for cholecystectomy   - +JUSTO non specific   - AMA, smooth muscle antibody, immunoglobulins negative   - Tumor markers negative    - Acute hepatitis panel negative   - Continue to trend CMP and CBC
- Pt with ampullary and distal CBD stones on imaging   - S/p ERCP 9/13 with 2 stones removed; no stent placed   - Previous provider D/w GI 9/13 no further recs   - Surgery team has seen pt and might plan for cholecystectomy next week , and recommend to cont monitoring LFT  - +JUSTO non specific   - AMA, smooth muscle antibody, immunoglobulins negative   - Tumor markers negative    - Acute hepatitis panel negative   - Continue to trend CMP and CBC

## 2024-09-17 NOTE — DISCHARGE NOTE PROVIDER - PROVIDER TOKENS
PROVIDER:[TOKEN:[[id number]],FOLLOWUP:[1-3 days]],PROVIDER:[TOKEN:[[id number]],FOLLOWUP:[2 weeks]],PROVIDER:[TOKEN:[[id number]],FOLLOWUP:[1-3 days]]

## 2024-09-17 NOTE — PROGRESS NOTE ADULT - SUBJECTIVE AND OBJECTIVE BOX
ACS/Trauma Surgery Daily Progress Note  =====================================================    SUBJECTIVE: Patient reports that they're feeling well. Denies fever, chills, N/V and abdominal pain is improved.     --------------------------------------------------------------------------------------  VITAL SIGNS:  T(C): 36.3 (09-17-24 @ 05:01), Max: 36.4 (09-16-24 @ 12:02)  HR: 45 (09-17-24 @ 05:01) (45 - 54)  BP: 102/56 (09-17-24 @ 05:01) (102/56 - 137/72)  RR: 18 (09-17-24 @ 05:01) (18 - 18)  SpO2: 95% (09-17-24 @ 05:01) (95% - 96%)  --------------------------------------------------------------------------------------    EXAM    General: NAD, resting in bed comfortably  Cardiac: Regular rate, normotensive  Respiratory: Nonlabored respirations, normal cw expansion, on RA  Abdomen: Soft, nondistended, nontender  Extremities: Warm, well perfused  Neuro: AOx3, CNII-XII intact on gross examination    --------------------------------------------------------------------------------------

## 2024-09-17 NOTE — PROGRESS NOTE ADULT - PROBLEM SELECTOR PLAN 3
- Acute diarrhea with Norovirus and Enteroaggregative E coli   - cont supportive care   - No current evidence of sepsis and pt is already on ABX   - cont with contact isolation
- as above
- as above  cont to monitor
- as above
Acute diarrhea with Norovirus and Enteroaggregative E coli   cont supportive care   No current evidence of sepsis and pt is already on ABX   cont with contact isolation
- iso poor po intake   - added K to IV fluids   - monitor k
- as above
- Acute diarrhea with Norovirus and Enteroaggregative E coli   - cont supportive care   - No current evidence of sepsis and pt is already on ABX   - cont with contact isolation

## 2024-09-17 NOTE — DISCHARGE NOTE PROVIDER - NSDCFUADDAPPT_GEN_ALL_CORE_FT
APPTS ARE READY TO BE MADE: [X] YES    Best Family or Patient Contact (if needed):    Additional Information about above appointments (if needed):    1: Follow up PCP Dr. Enrique   2: Follow up with Cards Dr. Avila   3: Follow up with surgery Dr. Ac outpt within 1-3 days - Call [phone] to arrange at [address] [place] [address], [address] [facility], [place] [address]    Other comments or requests:    APPTS ARE READY TO BE MADE: [X] YES    Best Family or Patient Contact (if needed):    Additional Information about above appointments (if needed):    1: Follow up PCP Dr. Enrique   2: Follow up with Cards Dr. Avila   3: Follow up with surgery Dr. Ac outpt within 1-3 days - Call [phone] to arrange at [address] [place] [address], [address] [facility], [place] [address]    Other comments or requests:         Patient informed us they already have secured a follow up appointment which is visible on [facility] on 9/20/24 12:20 p.m. [address] [facility] [id number]    Provided patient with other two provider referral information, however patient prefers to schedule the appointments on their own or call Inpatient Referral Team for assistance. Patient request our team phone number.

## 2024-09-17 NOTE — DISCHARGE NOTE NURSING/CASE MANAGEMENT/SOCIAL WORK - NSDCPEEMAIL_GEN_ALL_CORE
[facility] for Tobacco Control email [email or web address]

## 2024-09-17 NOTE — PRE-OP CHECKLIST - SELECT TESTS ORDERED
Angelique, sister calling. States that Teresa would like Paxlovid(antibiotic) ordered.    Pharmacy verified  
Called and advised patient of below and patient verbalized understanding.  
Called and left detailed message advising patient of below. Patient to call back with any questions.    Prescriptions sent.  
Patient's sister called. She was wondering if her sister could get a medication (antibodies) for when she had covid.   
Please advise:    Patient states that she needs Covid antibodies due to her severe COPD and current breathing problems.    Patient was prescribed prednisone yesterday via telephone encounter from Triage.     Please advise on further recommendations.   
Please advise:    Patient was seen in ED on 12/7 and prescribed Doxyxycline 100 BID for 10 days. Patient states she stopped the Doxy about 2 days after being prescribed due to it being too strong.     Patient requesting Zpak based on symptoms from 12/7. Patient requesting extension of prednisone prescribed 12/16. States she is usually prescribed 10 days not 5 days.    OK for Zpak and 5 more days of prednisone?  
Pt would need to have a positive COVID test to get any treatment for COVID. The monoclonal antibodies are not effective against the current COVID strains so they are no longer available for treatment. Treatment options would be the Paxlovid or Remdesivir IV therapy if she does not qualify for Paxlovid. The Remdesivir is a 3 dose infusion given over 3 days through the IV therapy clinic.       
Yes  
BMP/CBC/PT/PTT/INR
CBC/CMP/PT/PTT/INR
CBC/CMP/PT/PTT/INR/Type and Screen/POCT Blood Glucose
CBC/CMP/PT/PTT/INR/Type and Screen

## 2024-09-17 NOTE — PROVIDER CONTACT NOTE (OTHER) - SITUATION
pt HR this morning 45 and /56. pt is asymptomatic
Pt stated " I have diarrhea 3x since 7PM " Pt with 150ml of emesis, green in color
pt asking for clarification on lap salas. pt states she has net from previous hernia and cannot have laparoscopic procedure. pt is hard of hearing and may not be fully understanding.

## 2024-09-17 NOTE — PROVIDER CONTACT NOTE (OTHER) - RECOMMENDATIONS
have surgery come to bedside and further explain procedure
monitor pt
Save stool for testing, verify amount and consistency. Zofran as per PRN for nausea

## 2024-09-17 NOTE — PROGRESS NOTE ADULT - SUBJECTIVE AND OBJECTIVE BOX
Patient is a 74y old  Female who presents with a chief complaint of abdominal pain x few days (17 Sep 2024 11:29)      SUBJECTIVE / OVERNIGHT EVENTS: pt frustrated that she has not gone to surgery, no cp, sob, chills, abd pain improved     MEDICATIONS  (STANDING):  dextrose 5% + sodium chloride 0.45% with potassium chloride 20 mEq/L 1000 milliLiter(s) (70 mL/Hr) IV Continuous <Continuous>  folic acid 1 milliGRAM(s) Oral daily  influenza  Vaccine (HIGH DOSE) 0.5 milliLiter(s) IntraMuscular once  levothyroxine 112 MICROGram(s) Oral daily  memantine 5 milliGRAM(s) Oral two times a day  piperacillin/tazobactam IVPB.. 3.375 Gram(s) IV Intermittent every 8 hours    MEDICATIONS  (PRN):  acetaminophen     Tablet .. 650 milliGRAM(s) Oral every 6 hours PRN Temp greater or equal to 38C (100.4F), Mild Pain (1 - 3)  aluminum hydroxide/magnesium hydroxide/simethicone Suspension 30 milliLiter(s) Oral every 4 hours PRN Dyspepsia  melatonin 3 milliGRAM(s) Oral at bedtime PRN Insomnia  ondansetron Injectable 4 milliGRAM(s) IV Push every 8 hours PRN Nausea and/or Vomiting        CAPILLARY BLOOD GLUCOSE      POCT Blood Glucose.: 113 mg/dL (17 Sep 2024 12:34)  POCT Blood Glucose.: 107 mg/dL (17 Sep 2024 05:36)  POCT Blood Glucose.: 118 mg/dL (16 Sep 2024 23:52)  POCT Blood Glucose.: 105 mg/dL (16 Sep 2024 17:04)    I&O's Summary    16 Sep 2024 07:01  -  17 Sep 2024 07:00  --------------------------------------------------------  IN: 870 mL / OUT: 0 mL / NET: 870 mL    17 Sep 2024 07:01  -  17 Sep 2024 13:51  --------------------------------------------------------  IN: 100 mL / OUT: 0 mL / NET: 100 mL        PHYSICAL EXAM:  GENERAL: NAD, well-developed  HEAD:  Atraumatic, Normocephalic  EYES: EOMI, PERRLA, conjunctiva and sclera clear  NECK: Supple, No JVD  CHEST/LUNG: Clear to auscultation bilaterally; No wheeze  HEART: Regular rate and rhythm; No murmurs, rubs, or gallops  ABDOMEN: Soft, Nontender, Nondistended; Bowel sounds present  EXTREMITIES:  2+ Peripheral Pulses, No clubbing, cyanosis, or edema  PSYCH: AAOx3  NEUROLOGY: non-focal  SKIN: No rashes or lesions    LABS:                        11.8   6.21  )-----------( 357      ( 17 Sep 2024 05:51 )             36.5     09-17    140  |  107  |  6[L]  ----------------------------<  110[H]  4.0   |  23  |  0.69    Ca    8.9      17 Sep 2024 05:51  Phos  3.5     09-17  Mg     2.2     09-17    TPro  6.4  /  Alb  3.4  /  TBili  0.3  /  DBili  x   /  AST  17  /  ALT  49[H]  /  AlkPhos  182[H]  09-17    PT/INR - ( 17 Sep 2024 05:51 )   PT: 12.5 sec;   INR: 1.20 ratio         PTT - ( 17 Sep 2024 05:51 )  PTT:29.9 sec      Urinalysis Basic - ( 17 Sep 2024 05:51 )    Color: x / Appearance: x / SG: x / pH: x  Gluc: 110 mg/dL / Ketone: x  / Bili: x / Urobili: x   Blood: x / Protein: x / Nitrite: x   Leuk Esterase: x / RBC: x / WBC x   Sq Epi: x / Non Sq Epi: x / Bacteria: x        RADIOLOGY & ADDITIONAL TESTS:    Imaging Personally Reviewed:    Consultant(s) Notes Reviewed:      Care Discussed with Consultants/Other Providers:

## 2024-09-17 NOTE — PROGRESS NOTE ADULT - PROBLEM SELECTOR PLAN 4
- resolved
- fluctuating , but improving   cont to monitor
- resolved
- fluctuating , but improving   - cont to monitor
- fluctuating , but improving   - cont to monitor
- resolved
- levothyroxine   - tsh
- resolved  cont to monitor

## 2024-09-17 NOTE — DISCHARGE NOTE NURSING/CASE MANAGEMENT/SOCIAL WORK - NSDCPEWEB_GEN_ALL_CORE
[facility] for Tobacco Control website --- [email or web address] website --- [email or web address]

## 2024-09-17 NOTE — PROGRESS NOTE ADULT - PROBLEM SELECTOR PLAN 9
D/w daughter 9/13 updated on full plan of care    pending surgery recs re inpt vs outpt cholecystectomy, if no inpt intervention, can be discharged home on 10 days of augmentin with outpt follow up
Previous provider reported  provided 5 minutes of smoking cessation counseling, risk/harm of continued smoking and benefits of quitting discussed Patient declined to use nicotine patch while inpatient
D/w daughter 9/12 updated on full plan of care
cont with SCD so far
D/w daughter 9/11 updated on full plan of care
Previous provider reported  provided 5 minutes of smoking cessation counseling, risk/harm of continued smoking and benefits of quitting discussed Patient declined to use nicotine patch while inpatient
Previous provider reported  provided 5 minutes of smoking cessation counseling, risk/harm of continued smoking and benefits of quitting discussed Patient declined to use nicotine patch while inpatient

## 2024-09-17 NOTE — PROGRESS NOTE ADULT - PROBLEM SELECTOR PROBLEM 4
Hypothyroidism
Elevated liver enzymes
Elevated liver enzymes
Hypokalemia
Elevated liver enzymes
Hypokalemia

## 2024-09-17 NOTE — PROGRESS NOTE ADULT - ASSESSMENT
74F PMH HTN, hypothyroidism post procedure day 1 from ERCP for choledocholithiasis and cholangitis. General surgery consulted for inpatient cholecystectomy.     Recommendations:  - Plan for lap salas today @ 4PM  - NPO since MN  - AM labs within normal limits, with mildly elevated liver enzymes    ACS/Trauma  [id number]    
74F PMH HTN, hypothyroidism post procedure day 1 from ERCP for choledocholithiasis and cholangitis. General surgery consulted for inpatient cholecystectomy.     Recommendations:  - Plan for lap salas today  - NPO since MN    ACS/Trauma  [id number]
74F PMH HTN, hypothyroidism post procedure day 1 from ERCP for choledocholithiasis and cholangitis. General surgery consulted for possible inpatient cholecystectomy.     Recommendations:  - plan for lap salas today    ACS/Trauma  [id number]
75 yo F with hypothyroidism, HTN who presents for abdominal pain over the past few days. Advanced GI consulted for choledocholithiasis.     #choledocholithiasis   #cholangitis- Grade I    - MRCP with Ampullary and distal CBD stones. Mild cholangitis with tiny scattered peripheral associated biliary abscesses.  - s/p ERCP with 2 stones removed; no stent placed   - mild uptrend of alk phosp; likely related to ERCP     Recommendations:   -trend clinical symptoms, exam findings, vital signs, CBC, CMP, INR  -complete infectious workup and f/u BCx  -advance diet as tolerated   -will need surgery consult for [facility] inpatient vs outpatient   -no further GI workup needed     All recommendations are tentative until note is attested by attending.     Alexa Portillo, PGY-6  Gastroenterology/Hepatology Fellow  Available on [employer] Teams  [phone] ([facility] Short Range Pager)  [phone] ([facility] Long Range Pager)    On Weekends/Holidays (All day) and Weekdays after 5 PM to 8AM:  For non-urgent consults, please email [email or web address] or [email or web address]  For emergent consults, please contact on call GI team.  See Amion schedule ([facility]), W-locate paging system ([facility]), or call hospital  ([facility])    
74  F  hypothyroidism , HTN , presents for abdominal pain over the past few days found to have cholangitis and choledocholithiasis , S/P ERCP and is awaiting cholecystectomy.  
74  F  hypothyroidism , HTN , presents for abdominal pain over the past few days found to have cholangitis and choledocholithiasis 
74  F  hypothyroidism , HTN , presents for abdominal pain over the past few days 
74  F  hypothyroidism , HTN , presents for abdominal pain over the past few days found to have cholangitis and choledocholithiasis , S/P ERCP and is awaiting cholecystectomy.  
74  F  hypothyroidism , HTN , presents for abdominal pain over the past few days found to have cholangitis and choledocholithiasis 
74  F  hypothyroidism , HTN , presents for abdominal pain over the past few days found to have cholangitis and choledocholithiasis , S/P ERCP and is awaiting cholecystectomy.  
74  F  hypothyroidism , HTN , presents for abdominal pain over the past few days 
74  F  hypothyroidism , HTN , presents for abdominal pain over the past few days found to have cholangitis and CBD dilatation

## 2024-09-17 NOTE — PROGRESS NOTE ADULT - PROBLEM SELECTOR PLAN 10
cont with SCD     D/w daughter 9/17 updated on full plan of care
cont with SCD     D/w daughter x2 9/16 updated on full plan of care
cont with SCD so far

## 2024-09-17 NOTE — PROGRESS NOTE ADULT - PROBLEM SELECTOR PROBLEM 7
HTN (hypertension)
History of memory loss
Nicotine dependence
History of memory loss
History of memory loss
HTN (hypertension)
History of memory loss
HTN (hypertension)

## 2024-09-17 NOTE — DISCHARGE NOTE PROVIDER - HOSPITAL COURSE
HPI:  Patient is a 74  year old female w/pmh hypothyroidism , HTN , presents for abdominal pain over the past few days .   Two days prior to admission, patient reports right upper abdominal pain , sharp in character , radiating to back , worse with food symptoms associated with fever at the time, no diarrhea , no nausea or vomiting.  Since then , pain has mostly resolved , but patient reports poor appetite and decreased oral intake, and generalized weakness. She was evaluated by her PMD and noted to have tenderness in her RUQ and subsequently referred to the hospital.     Hospital Course:  Pt presented with abdominal pain 2/2 Choledocholithiasis. Pt with ampullary and distal CBD stones on imaging. Pos JUSTO non specific, AMA, smooth muscle antibody, immunoglobulins negative. Tumor markers negative, acute hepatitis panel negative. S/p ERCP 9/13 with 2 stones removed; no stent placed. Per GI 9/13 no further recs. Surgery consulted for possible OR planning for cholecystectomy --> surgery cancelled and changed to outpt procedure, pt to follow up with Dr. Ac outpt for lap salas. Pt a/w biliary abscesses on imaging, started on zosyn, changed to PO augmentin for DC. Pt c/o acute diarrhea with Norovirus and Enteroaggregative E coli, No current evidence of sepsis and pt is already on ABX. Placed on contact isolation.  Pts liver enzymes fluctuating but improving. On vitals, pt bradycardic, EKG sinus catracho. Evaled by cards and rec to monitor and avoid avnb. Pts labs noted for hypokalemia, resolved. Pt on levothyroxine for hypothyroidism, TSH elevated in setting of acute illness, rec to repeat tsh in 6 weeks as oupt. Losartan held given soft BP, continue to hold upon DC. Follow up PCP/cards outpt.     Important Medication Changes and Reason:  - losartan held   - started on augmentin    Active or Pending Issues Requiring Follow-up:  - Follow up PCP, cardiology, surgery     Advanced Directives:   [X] Full code  [ ] DNR  [ ] Hospice    Discharge Diagnoses:  - choledocholithiasis  - biliary abcess  - bradycardic   - norovirus       Discharge/Dispo/Med rec discussed with attending Dr. Cabrera. Patient medically cleared for discharge Home with outpatient follow up with PCP, surgery and cardiology

## 2024-09-17 NOTE — PROGRESS NOTE ADULT - PROBLEM SELECTOR PROBLEM 1
Choledocholithiasis
Choledocholithiasis
Pancreatitis
Choledocholithiasis

## 2024-09-17 NOTE — PROGRESS NOTE ADULT - PROBLEM SELECTOR PLAN 5
- levothyroxine   - tsh elevated in the setting of acute illness   - continue synthroid 112mcgs and repeat tsh in 6 weeks as oupt
- potassium is repleted --> f/up AM labs   cont to monitor
- levothyroxine   - tsh elevated in the setting of acute illness   - continue synthroid 112mcgs and repeat tsh in 6 weeks
- resolved   - cont to monitor
- levothyroxine   - tsh elevated in the setting of acute illness   - continue synthroid 112mcgs and repeat tsh in 6 weeks
- holding losartan in setting of pancreatitis , can resume once clinically improved
- levothyroxine   - tsh elevated in the setting of acute illness   - continue synthroid 112mcgs and repeat tsh in 6 weeks
- resolved   - cont to monitor

## 2024-09-17 NOTE — PROGRESS NOTE ADULT - REASON FOR ADMISSION
abdominal pain x few days

## 2024-09-17 NOTE — DISCHARGE NOTE PROVIDER - CARE PROVIDERS DIRECT ADDRESSES
[email or web address]

## 2024-09-17 NOTE — PROGRESS NOTE ADULT - PROBLEM SELECTOR PROBLEM 9
Prophylactic measure
Nicotine dependence

## 2024-09-17 NOTE — PROGRESS NOTE ADULT - PROBLEM SELECTOR PLAN 6
- CW  losartan 50mg  Monitor BP
-c/w memantine
- levothyroxine   - tsh elevated in the setting of acute illness   - continue synthroid 112mcgs and repeat tsh in 6 weeks as oupt
- levothyroxine   - tsh elevated in the setting of acute illness   - continue synthroid 112mcgs and repeat tsh in 6 weeks as oupt
- resume losartan 50mg
- holding losartan in the setting of soft BPs
- levothyroxine   - tsh elevated in the setting of acute illness   - continue synthroid 112mcgs and repeat tsh in 6 weeks as oupt
- holding losartan in the setting of soft BPs

## 2024-09-19 ENCOUNTER — NON-APPOINTMENT (OUTPATIENT)
Age: 74
End: 2024-09-19

## 2024-09-20 ENCOUNTER — APPOINTMENT (OUTPATIENT)
Dept: INTERNAL MEDICINE | Facility: CLINIC | Age: 74
End: 2024-09-20

## 2024-09-20 VITALS
BODY MASS INDEX: 23.22 KG/M2 | OXYGEN SATURATION: 96 % | HEIGHT: 64 IN | DIASTOLIC BLOOD PRESSURE: 76 MMHG | HEART RATE: 67 BPM | SYSTOLIC BLOOD PRESSURE: 145 MMHG | WEIGHT: 136 LBS | TEMPERATURE: 98.3 F

## 2024-09-20 DIAGNOSIS — K83.09 OTHER CHOLANGITIS: ICD-10-CM

## 2024-09-20 DIAGNOSIS — Z09 ENCOUNTER FOR FOLLOW-UP EXAMINATION AFTER COMPLETED TREATMENT FOR CONDITIONS OTHER THAN MALIGNANT NEOPLASM: ICD-10-CM

## 2024-09-20 PROCEDURE — 99214 OFFICE O/P EST MOD 30 MIN: CPT

## 2024-09-20 PROCEDURE — 36415 COLL VENOUS BLD VENIPUNCTURE: CPT

## 2024-09-20 PROCEDURE — G2211 COMPLEX E/M VISIT ADD ON: CPT

## 2024-09-22 RX ORDER — MEMANTINE 7 MG/1
1 CAPSULE, EXTENDED RELEASE ORAL
Refills: 0 | DISCHARGE

## 2024-09-22 RX ORDER — FOLIC ACID 1 MG
0 TABLET ORAL
Refills: 0 | DISCHARGE

## 2024-09-22 RX ORDER — LOSARTAN POTASSIUM 50 MG/1
1 TABLET ORAL
Refills: 0 | DISCHARGE

## 2024-09-22 RX ORDER — LEVOTHYROXINE SODIUM 100 MCG
1 TABLET ORAL
Refills: 0 | DISCHARGE

## 2024-09-24 LAB
ALBUMIN SERPL ELPH-MCNC: 4.3 G/DL
ALP BLD-CCNC: 159 U/L
ALT SERPL-CCNC: 48 U/L
ANION GAP SERPL CALC-SCNC: 16 MMOL/L
AST SERPL-CCNC: 32 U/L
BILIRUB SERPL-MCNC: 0.4 MG/DL
BUN SERPL-MCNC: 7 MG/DL
CALCIUM SERPL-MCNC: 9.7 MG/DL
CHLORIDE SERPL-SCNC: 107 MMOL/L
CO2 SERPL-SCNC: 22 MMOL/L
CREAT SERPL-MCNC: 0.68 MG/DL
EGFR: 91 ML/MIN/1.73M2
GLUCOSE SERPL-MCNC: 85 MG/DL
POTASSIUM SERPL-SCNC: 4.8 MMOL/L
PROT SERPL-MCNC: 7.3 G/DL
SODIUM SERPL-SCNC: 145 MMOL/L

## 2024-09-24 NOTE — HISTORY OF PRESENT ILLNESS
[FreeTextEntry8] : Patient is a 74 yr old female present today for an acute visit.  Patient presents to the office for follow-up was seen at the hospital for obstructed bile duct, had infection.  Course was complicated with norovirus E. coli.  Currently denies any pain with eating denies any fevers chills abdominal pain.  Did have ERCP which successfully remove the stones, was due for hospital cholecystectomy however ultimately deferred to outpatient cholecystectomy. Did stop taking BP medication

## 2024-09-24 NOTE — ADDENDUM
[FreeTextEntry1] : I, Mary Maki, acted as a scribe on behalf of Dr. Raman Enrique MD, on 09/20/2024.   All medical entries made by the scribe were at my, Dr. Raman Enrique MD, direction and personally dictated by me on 09/20/2024. I have reviewed the chart and agree that the record accurately reflects my personal performance of the history, physical exam, assessment and plan. I have also personally directed, reviewed, and agreed with the chart.

## 2024-09-24 NOTE — ASSESSMENT
[FreeTextEntry1] : Abdominal exam is benign, to follow-up with general surgeon, name provided, will check LFTs.  If any worsening symptoms fevers abdominal pain to call office or go to the hospital.  Able to restart BP medication as BP is elevated.

## 2024-10-23 PROBLEM — E03.9 HYPOTHYROIDISM, UNSPECIFIED: Chronic | Status: ACTIVE | Noted: 2024-09-10

## 2024-10-29 ENCOUNTER — NON-APPOINTMENT (OUTPATIENT)
Age: 74
End: 2024-10-29

## 2024-10-29 ENCOUNTER — APPOINTMENT (OUTPATIENT)
Dept: INTERNAL MEDICINE | Facility: CLINIC | Age: 74
End: 2024-10-29
Payer: MEDICARE

## 2024-10-29 VITALS
TEMPERATURE: 97.6 F | SYSTOLIC BLOOD PRESSURE: 137 MMHG | DIASTOLIC BLOOD PRESSURE: 81 MMHG | HEIGHT: 63 IN | BODY MASS INDEX: 23.57 KG/M2 | WEIGHT: 133 LBS | HEART RATE: 62 BPM | OXYGEN SATURATION: 98 %

## 2024-10-29 DIAGNOSIS — Z01.818 ENCOUNTER FOR OTHER PREPROCEDURAL EXAMINATION: ICD-10-CM

## 2024-10-29 PROCEDURE — 99213 OFFICE O/P EST LOW 20 MIN: CPT

## 2024-10-29 PROCEDURE — G2211 COMPLEX E/M VISIT ADD ON: CPT

## 2024-11-05 ENCOUNTER — TRANSCRIPTION ENCOUNTER (OUTPATIENT)
Age: 74
End: 2024-11-05

## 2024-11-05 ENCOUNTER — NON-APPOINTMENT (OUTPATIENT)
Age: 74
End: 2024-11-05

## 2024-11-05 LAB
ALBUMIN SERPL ELPH-MCNC: 4.2 G/DL
ALP BLD-CCNC: 149 U/L
ALT SERPL-CCNC: 37 U/L
ANION GAP SERPL CALC-SCNC: 13 MMOL/L
APTT BLD: 34.1 SEC
AST SERPL-CCNC: 28 U/L
BASOPHILS # BLD AUTO: 0.06 K/UL
BASOPHILS NFR BLD AUTO: 0.8 %
BILIRUB SERPL-MCNC: 0.5 MG/DL
BUN SERPL-MCNC: 12 MG/DL
CALCIUM SERPL-MCNC: 9.7 MG/DL
CHLORIDE SERPL-SCNC: 105 MMOL/L
CO2 SERPL-SCNC: 24 MMOL/L
CREAT SERPL-MCNC: 0.58 MG/DL
EGFR: 94 ML/MIN/1.73M2
EOSINOPHIL # BLD AUTO: 0.38 K/UL
EOSINOPHIL NFR BLD AUTO: 5.4 %
ESTIMATED AVERAGE GLUCOSE: 97 MG/DL
GLUCOSE SERPL-MCNC: 93 MG/DL
HBA1C MFR BLD HPLC: 5 %
HCT VFR BLD CALC: 42.6 %
HGB BLD-MCNC: 13.2 G/DL
IMM GRANULOCYTES NFR BLD AUTO: 0.4 %
INR PPP: 0.92 RATIO
LYMPHOCYTES # BLD AUTO: 1.88 K/UL
LYMPHOCYTES NFR BLD AUTO: 26.6 %
MAN DIFF?: NORMAL
MCHC RBC-ENTMCNC: 28.3 PG
MCHC RBC-ENTMCNC: 31 G/DL
MCV RBC AUTO: 91.2 FL
MONOCYTES # BLD AUTO: 0.61 K/UL
MONOCYTES NFR BLD AUTO: 8.6 %
NEUTROPHILS # BLD AUTO: 4.1 K/UL
NEUTROPHILS NFR BLD AUTO: 58.2 %
PLATELET # BLD AUTO: 314 K/UL
POTASSIUM SERPL-SCNC: 4.4 MMOL/L
PROT SERPL-MCNC: 7.4 G/DL
PT BLD: 10.9 SEC
RBC # BLD: 4.67 M/UL
RBC # FLD: 16.6 %
SODIUM SERPL-SCNC: 142 MMOL/L
WBC # FLD AUTO: 7.06 K/UL

## 2024-11-13 VITALS
DIASTOLIC BLOOD PRESSURE: 73 MMHG | OXYGEN SATURATION: 99 % | SYSTOLIC BLOOD PRESSURE: 123 MMHG | WEIGHT: 135.8 LBS | TEMPERATURE: 98 F | HEIGHT: 64 IN | RESPIRATION RATE: 16 BRPM | HEART RATE: 52 BPM

## 2024-11-13 NOTE — ASU PATIENT PROFILE, ADULT - FALL HARM RISK - UNIVERSAL INTERVENTIONS
Bed in lowest position, wheels locked, appropriate side rails in place/Call bell, personal items and telephone in reach/Instruct patient to call for assistance before getting out of bed or chair/Non-slip footwear when patient is out of bed/[place] to call system/Physically safe environment - no spills, clutter or unnecessary equipment/Purposeful Proactive Rounding/Room/bathroom lighting operational, light cord in reach

## 2024-11-13 NOTE — ASU PATIENT PROFILE, ADULT - NSICDXPASTMEDICALHX_GEN_ALL_CORE_FT
[FreeTextEntry1] : Ascus pap for hpv testing hpv collected will call if hpv is positive PAST MEDICAL HISTORY:  Cholelithiasis     HTN (hypertension)     Hypothyroid     Nicotine dependence     Peripheral neuropathy      PAST MEDICAL HISTORY:  Cholelithiasis     HTN (hypertension) denies    Hypothyroid     Nicotine dependence     Peripheral neuropathy

## 2024-11-14 ENCOUNTER — OUTPATIENT (OUTPATIENT)
Dept: OUTPATIENT SERVICES | Facility: HOSPITAL | Age: 74
LOS: 1 days | Discharge: HOME | End: 2024-11-14
Payer: MEDICARE

## 2024-11-14 ENCOUNTER — TRANSCRIPTION ENCOUNTER (OUTPATIENT)
Age: 74
End: 2024-11-14

## 2024-11-14 DIAGNOSIS — Z98.890 OTHER SPECIFIED POSTPROCEDURAL STATES: Chronic | ICD-10-CM

## 2024-11-14 LAB
BLD GP AB SCN SERPL QL: NEGATIVE — SIGNIFICANT CHANGE UP
RH IG SCN BLD-IMP: POSITIVE — SIGNIFICANT CHANGE UP

## 2024-11-14 PROCEDURE — 88304 TISSUE EXAM BY PATHOLOGIST: CPT | Mod: 26

## 2024-11-14 RX ORDER — OXYCODONE HYDROCHLORIDE 30 MG/1
2.5 TABLET ORAL EVERY 6 HOURS
Refills: 0 | Status: DISCONTINUED | OUTPATIENT
Start: 2024-11-14 | End: 2024-11-15

## 2024-11-14 RX ORDER — LOSARTAN POTASSIUM 25 MG/1
1 TABLET ORAL
Refills: 0 | DISCHARGE

## 2024-11-14 RX ORDER — ACETAMINOPHEN 500 MG
650 TABLET ORAL EVERY 6 HOURS
Refills: 0 | Status: DISCONTINUED | OUTPATIENT
Start: 2024-11-14 | End: 2024-11-15

## 2024-11-14 RX ORDER — LEVOTHYROXINE SODIUM 88 MCG
112 TABLET ORAL DAILY
Refills: 0 | Status: DISCONTINUED | OUTPATIENT
Start: 2024-11-14 | End: 2024-11-15

## 2024-11-14 RX ORDER — OXYCODONE HYDROCHLORIDE 30 MG/1
5 TABLET ORAL EVERY 6 HOURS
Refills: 0 | Status: DISCONTINUED | OUTPATIENT
Start: 2024-11-14 | End: 2024-11-15

## 2024-11-14 RX ORDER — HEPARIN SODIUM 10000 [USP'U]/ML
5000 INJECTION INTRAVENOUS; SUBCUTANEOUS EVERY 8 HOURS
Refills: 0 | Status: DISCONTINUED | OUTPATIENT
Start: 2024-11-15 | End: 2024-11-15

## 2024-11-14 RX ORDER — HYDROMORPHONE HCL/0.9% NACL/PF 6 MG/30 ML
0.25 PATIENT CONTROLLED ANALGESIA SYRINGE INTRAVENOUS EVERY 6 HOURS
Refills: 0 | Status: DISCONTINUED | OUTPATIENT
Start: 2024-11-14 | End: 2024-11-15

## 2024-11-14 RX ADMIN — Medication 100 MILLILITER(S): at 23:48

## 2024-11-14 NOTE — PRE-ANESTHESIA EVALUATION ADULT - TEMPERATURE IN CELSIUS (DEGREES C)
36.4 Initiate Treatment: Ketoconazole 2% topical cream \\nTriamcinolone 0.1% topical cream Detail Level: Zone Render In Strict Bullet Format?: No

## 2024-11-14 NOTE — ASU DISCHARGE PLAN (ADULT/PEDIATRIC) - ASU DC SPECIAL INSTRUCTIONSFT
General Discharge Instructions:  Please resume all regular home medications unless specifically advised not to take a particular medication. Also, please take any new medications as prescribed.  Please get plenty of rest, continue to ambulate several times per day, and drink adequate amounts of fluids. Avoid lifting weights greater than 5-10 lbs until you follow-up with your surgeon, who will instruct you further regarding activity restrictions.  Avoid driving or operating heavy machinery while taking pain medications.  Please follow-up with your surgeon and Primary Care Provider (PCP) as advised.  Incision Care:  *Please call your doctor or nurse practitioner if you have increased pain, swelling, redness, or drainage from the incision site.  *Avoid swimming and baths until your follow-up appointment.  *You may shower, and wash surgical incisions with a mild soap and warm water. Gently pat the area dry.  *If you have staples, they will be removed at your follow-up appointment.  *If you have steri-strips, they will fall off on their own. Please remove any remaining strips 7-10 days after surgery.

## 2024-11-14 NOTE — ASU DISCHARGE PLAN (ADULT/PEDIATRIC) - NS MD DC FALL RISK RISK
For information on Fall & Injury Prevention, visit: [email or web address] OR  [email or web address] OR  https://www.cdc.gov/steadi/patient.html

## 2024-11-14 NOTE — BRIEF OPERATIVE NOTE - OPERATION/FINDINGS
Nic entry via infraumbilical incision. Additional trochars placed under direct supervision. Omental attachments to anterior abdominal wall taken down with ligasure.  Pt placed in reverse Trendelenburg w/ right side up. Omental attachments to GB were gently swept away. GB fundus retracted superiorly over dome of liver. Filmy adhesions between the GB & omentum/duo cauterized. GB infundibulum retracted laterally towards RLQ exposing Calot’s triangle. Critical view of safety obtained. Cystic artery clipped and divided. Martinez clamp used to occlude GB neck, conrast injected and IOC revealed no occlusion. Cystic artery clipped and divided. Peritoneal attachments btw GB & liver bed  w/ electrocautery. Hemostasis achieved. No leakage of bile from cystic duct stump. Fascia closed with Vicryl. Skin closed with Monocryl.

## 2024-11-14 NOTE — ASU DISCHARGE PLAN (ADULT/PEDIATRIC) - FINANCIAL ASSISTANCE
[facility] provides services at a reduced cost to those who are determined to be eligible through [facility]’s financial assistance program. Information regarding [facility]’s financial assistance program can be found by going to [email or web address] or by calling [phone].

## 2024-11-14 NOTE — BRIEF OPERATIVE NOTE - NSICDXBRIEFPROCEDURE_GEN_ALL_CORE_FT
PROCEDURES:  Laparoscopic cholecystectomy 14-Nov-2024 18:35:30  Naz Peña  Intraoperative cholangiogram 14-Nov-2024 18:35:59  Naz Peña

## 2024-11-15 ENCOUNTER — TRANSCRIPTION ENCOUNTER (OUTPATIENT)
Age: 74
End: 2024-11-15

## 2024-11-15 VITALS
OXYGEN SATURATION: 94 % | DIASTOLIC BLOOD PRESSURE: 68 MMHG | RESPIRATION RATE: 16 BRPM | HEART RATE: 60 BPM | TEMPERATURE: 99 F | SYSTOLIC BLOOD PRESSURE: 127 MMHG

## 2024-11-15 PROCEDURE — 86901 BLOOD TYPING SEROLOGIC RH(D): CPT

## 2024-11-15 PROCEDURE — 76000 FLUOROSCOPY <1 HR PHYS/QHP: CPT

## 2024-11-15 PROCEDURE — 88304 TISSUE EXAM BY PATHOLOGIST: CPT

## 2024-11-15 PROCEDURE — C9399: CPT

## 2024-11-15 PROCEDURE — 86850 RBC ANTIBODY SCREEN: CPT

## 2024-11-15 PROCEDURE — 47563 LAPARO CHOLECYSTECTOMY/GRAPH: CPT

## 2024-11-15 PROCEDURE — 86900 BLOOD TYPING SEROLOGIC ABO: CPT

## 2024-11-15 RX ORDER — OXYCODONE HYDROCHLORIDE 30 MG/1
0.5 TABLET ORAL
Qty: 5 | Refills: 0
Start: 2024-11-15

## 2024-11-15 RX ORDER — DOCUSATE SODIUM 100 MG
1 CAPSULE ORAL
Qty: 14 | Refills: 0
Start: 2024-11-15

## 2024-11-15 RX ADMIN — Medication 650 MILLIGRAM(S): at 03:14

## 2024-11-15 RX ADMIN — HEPARIN SODIUM 5000 UNIT(S): 10000 INJECTION INTRAVENOUS; SUBCUTANEOUS at 03:13

## 2024-11-15 RX ADMIN — Medication 112 MICROGRAM(S): at 06:02

## 2024-11-15 RX ADMIN — Medication 650 MILLIGRAM(S): at 04:18

## 2024-11-15 RX ADMIN — HEPARIN SODIUM 5000 UNIT(S): 10000 INJECTION INTRAVENOUS; SUBCUTANEOUS at 11:20

## 2024-11-15 NOTE — DISCHARGE NOTE NURSING/CASE MANAGEMENT/SOCIAL WORK - FINANCIAL ASSISTANCE
[facility] provides services at a reduced cost to those who are determined to be eligible through [facility]’s financial assistance program. Information regarding [facility]’s financial assistance program can be found by going to [email or web address] or by calling [phone].

## 2024-11-15 NOTE — PROGRESS NOTE ADULT - ASSESSMENT
74F PMHx HTN, peripheral neuropathy, hypothyroidism PSHx appendectomy now s/p laparoscopic cholecystectomy and IOC (11/14)    23 hours obs  Low fat diet  Pain/ nausea control  HSQ/SCDs/OOBA/IS  No AM labs
A/P 74F PMHx HTN, peripheral neuropathy, hypothyroidism PSHx appendectomy now s/p laparoscopic cholecystectomy and IOC (11/14)    23 hours obs  Low fat  Pain/ nausea control  HSQ/SCDs/OOBA/IS  No AM labs  Dispo: home 11/15

## 2024-11-15 NOTE — PROGRESS NOTE ADULT - SUBJECTIVE AND OBJECTIVE BOX
POST-OP DAY: #1 s/p cholecystectomy      SUBJECTIVE: Patient seen and examined bedside by chief resident on AM rounds. Patient states she feels well and has no acute complaints. Tolerating diet without any nausea/vomiting. -BF     heparin   Injectable 5000 Unit(s) SubCutaneous every 8 hours    MEDICATIONS  (PRN):  acetaminophen     Tablet .. 650 milliGRAM(s) Oral every 6 hours PRN Mild Pain (1 - 3)  HYDROmorphone  Injectable 0.25 milliGRAM(s) IV Push every 6 hours PRN breakthrough pain  oxyCODONE    IR 5 milliGRAM(s) Oral every 6 hours PRN Severe Pain (7 - 10)  oxyCODONE    IR 2.5 milliGRAM(s) Oral every 6 hours PRN Moderate Pain (4 - 6)      I&O's Detail    14 Nov 2024 07:01  -  15 Nov 2024 07:00  --------------------------------------------------------  IN:    Lactated Ringers: 900 mL    Oral Fluid: 250 mL  Total IN: 1150 mL    OUT:    Voided (mL): 350 mL  Total OUT: 350 mL    Total NET: 800 mL          Vital Signs Last 24 Hrs  T(C): 36.9 (15 Nov 2024 04:45), Max: 36.9 (15 Nov 2024 04:45)  T(F): 98.4 (15 Nov 2024 04:45), Max: 98.4 (15 Nov 2024 04:45)  HR: 55 (15 Nov 2024 04:45) (36 - 61)  BP: 120/69 (15 Nov 2024 04:45) (120/69 - 160/73)  BP(mean): 93 (14 Nov 2024 21:16) (93 - 105)  RR: 17 (15 Nov 2024 04:45) (9 - 22)  SpO2: 94% (15 Nov 2024 04:45) (94% - 100%)    Parameters below as of 15 Nov 2024 04:45  Patient On (Oxygen Delivery Method): room air        General: NAD, resting comfortably in bed  Pulm: Nonlabored breathing, no respiratory distress  Abd: soft, nondistended, appropriate post surgical TTP. incisions c/d/i. (-) rebound, (-) guarding   Extrem: WWP, no edema, SCDs in place    LABS:                RADIOLOGY & ADDITIONAL STUDIES:    
Procedure: laparoscopic cholecystectomy and IOC  Surgeon: Dr. Diane    S: Pt resting comfortably in bed and has no complaints. Denies CP, SOB, DURON, calf tenderness. Pain controlled with medication.    O:  T(C): 36.3 (11-14-24 @ 21:16), Max: 36.4 (11-14-24 @ 18:52)  T(F): 97.3 (11-14-24 @ 21:16), Max: 97.6 (11-14-24 @ 18:52)  HR: 50 (11-14-24 @ 21:16) (36 - 59)  BP: 148/66 (11-14-24 @ 21:16) (138/58 - 160/73)  RR: 14 (11-14-24 @ 21:16) (9 - 22)  SpO2: 96% (11-14-24 @ 21:16) (96% - 100%)  Wt(kg): --            Gen: NAD, resting comfortably in bed  Pulm: Nonlabored breathing, no respiratory distress  Abd: soft, appropriate nick-incisional TTP/ND Incision: CDI  Extrem: WWP, SCDs in place

## 2024-11-15 NOTE — DISCHARGE NOTE NURSING/CASE MANAGEMENT/SOCIAL WORK - NSDCPEWEB_GEN_ALL_CORE
[facility] for Tobacco Control website --- [email or web address] website --- www.[email or web address] [email or web address]

## 2024-11-15 NOTE — CONSULT NOTE ADULT - SUBJECTIVE AND OBJECTIVE BOX
HPI: 74F PMHx HTN, peripheral neuropathy, hypothyroidism PSHx appendectomy now s/p laparoscopic cholecystectomy and IOC (11/14). Patient comfortable at bedside, resting, pain controlled on current regiment, some mild discomfort from abdomen. Pt denies CP / SOB / Fevers / chills / N /V.     ROS: All 12 systems reviewed and negative except for HPI     PAST MEDICAL & SURGICAL HISTORY:  Hypothyroid      HTN (hypertension)  denies      Cholelithiasis      Nicotine dependence      Peripheral neuropathy      S/P appendectomy      H/O umbilical hernia repair          Allergies    No Known Allergies    Intolerances        MEDICATIONS  (STANDING):  heparin   Injectable 5000 Unit(s) SubCutaneous every 8 hours  lactated ringers. 1000 milliLiter(s) (100 mL/Hr) IV Continuous <Continuous>  levothyroxine 112 MICROGram(s) Oral daily    MEDICATIONS  (PRN):  acetaminophen     Tablet .. 650 milliGRAM(s) Oral every 6 hours PRN Mild Pain (1 - 3)  HYDROmorphone  Injectable 0.25 milliGRAM(s) IV Push every 6 hours PRN breakthrough pain  oxyCODONE    IR 5 milliGRAM(s) Oral every 6 hours PRN Severe Pain (7 - 10)  oxyCODONE    IR 2.5 milliGRAM(s) Oral every 6 hours PRN Moderate Pain (4 - 6)      SOCIAL HISTORY:    FAMILY HISTORY:  No pertinent family history in first degree relatives          Vital Signs Last 24 Hrs  T(C): 37.2 (15 Nov 2024 12:29), Max: 37.2 (15 Nov 2024 12:29)  T(F): 98.9 (15 Nov 2024 12:29), Max: 98.9 (15 Nov 2024 12:29)  HR: 60 (15 Nov 2024 12:29) (36 - 61)  BP: 127/68 (15 Nov 2024 12:29) (108/56 - 160/73)  BP(mean): 93 (14 Nov 2024 21:16) (93 - 105)  RR: 16 (15 Nov 2024 12:29) (9 - 22)  SpO2: 94% (15 Nov 2024 12:29) (94% - 100%)    Parameters below as of 15 Nov 2024 12:29  Patient On (Oxygen Delivery Method): room air        I&O's Summary    14 Nov 2024 07:01  -  15 Nov 2024 07:00  --------------------------------------------------------  IN: 1150 mL / OUT: 350 mL / NET: 800 mL    15 Nov 2024 07:01  -  15 Nov 2024 14:16  --------------------------------------------------------  IN: 1150 mL / OUT: 600 mL / NET: 550 mL        LABS:                CAPILLARY BLOOD GLUCOSE          Cultures:      PHYSICAL EXAM:  General: NAD, resting comfortably  HEENT: NC/AT, EOMI, normal hearing, no oral lesions, no LAD, neck supple  Pulmonary: Normal resp effort, CTA-B  Cardiovascular: NSR, no murmurs  Abdominal: Soft, ND, post op tenderness, incisions CDI no organomegaly  Extremities: (+) DP/PT pulses. FROM, normal strength, no clubbing/cyanosis/erythema/edema  Neuro: A/O x 3, CNs II-XII grossly intact, normal sensation, no focal deficits  Pulses: Palpable distal pulses    RADIOLOGY & ADDITIONAL STUDIES:      ASSESSMENT:      PLAN:

## 2024-11-15 NOTE — DISCHARGE NOTE NURSING/CASE MANAGEMENT/SOCIAL WORK - PATIENT PORTAL LINK FT
You can access the FollowMyHealth Patient Portal offered by [facility] by registering at the following website: [email or web address]. By joining [facility]’s FollowMyHealth portal, you will also be able to view your health information using other applications (apps) compatible with our system.

## 2024-11-15 NOTE — CONSULT NOTE ADULT - ASSESSMENT
74F PMHx HTN, peripheral neuropathy, hypothyroidism PSHx appendectomy now s/p laparoscopic cholecystectomy and IOC (11/14). Patient comfortable at bedside, resting, pain controlled on current regiment, some mild discomfort from abdomen. Pt denies CP / SOB / Fevers / chills / N /V.   HTN- uncontrolled- continue losartan  hypothyroidism- daily levothyroxine 112 mcg   namenda for dementia     Home Medications:  folic acid:  (13 Nov 2024 14:01)  levothyroxine 112 mcg (0.112 mg) oral tablet: 1 tab(s) orally once a day (14 Nov 2024 12:49)  losartan 50 mg oral tablet: 1 tab(s) orally once a day (14 Nov 2024 13:03)  Namenda 5 mg oral tablet: 1 tab(s) orally (14 Nov 2024 12:49)

## 2024-11-15 NOTE — DISCHARGE NOTE NURSING/CASE MANAGEMENT/SOCIAL WORK - NSDCPEEMAIL_GEN_ALL_CORE
[facility] for Tobacco Control email [email or web address]

## 2024-12-03 NOTE — DISCHARGE NOTE NURSING/CASE MANAGEMENT/SOCIAL WORK - NSCORESITESY/N_GEN_A_CORE_RD
Detail Level: Detailed Detail Level: Zone Prescription Strength Graduated Compression Stockings Recommendations: The patient was counseled that prescription strength graduated compression stockings should be worn for all waking hours. They will follow up with a venous specialist to monitor graduated compression stocking usage and their symptoms. No

## 2025-01-15 ENCOUNTER — APPOINTMENT (OUTPATIENT)
Dept: INTERNAL MEDICINE | Facility: CLINIC | Age: 75
End: 2025-01-15
Payer: MEDICARE

## 2025-01-15 VITALS
TEMPERATURE: 98 F | WEIGHT: 133 LBS | OXYGEN SATURATION: 98 % | BODY MASS INDEX: 23.57 KG/M2 | HEART RATE: 64 BPM | DIASTOLIC BLOOD PRESSURE: 85 MMHG | SYSTOLIC BLOOD PRESSURE: 163 MMHG | HEIGHT: 63 IN

## 2025-01-15 DIAGNOSIS — R41.3 OTHER AMNESIA: ICD-10-CM

## 2025-01-15 PROCEDURE — 99214 OFFICE O/P EST MOD 30 MIN: CPT

## 2025-01-15 PROCEDURE — G2211 COMPLEX E/M VISIT ADD ON: CPT

## 2025-01-15 RX ORDER — MEMANTINE HYDROCHLORIDE 10 MG/1
10 TABLET, FILM COATED ORAL DAILY
Qty: 90 | Refills: 3 | Status: ACTIVE | COMMUNITY
Start: 2025-01-15 | End: 1900-01-01

## 2025-03-15 ENCOUNTER — RX RENEWAL (OUTPATIENT)
Age: 75
End: 2025-03-15

## 2025-05-07 NOTE — ED ADULT TRIAGE NOTE - ESI TRIAGE ACUITY LEVEL, MLM
[facility] [place] [address]  Phone: [phone]  Fax: [phone]      Problem Based Overview with Integrated Assessment and Plan      History of Present Illness  The patient presents for evaluation of hypertension and diabetes.    Hypertension  - She reports consistent blood pressure readings, with 125/88 recorded at home last night.  - She typically monitors her blood pressure twice daily, with an average systolic around 151 and occasional spikes to 190.  - She is on lisinopril, hydrochlorothiazide. She missed her medications today.  - No recent facial drooping or speech difficulties.    Diabetes  - She does not monitor her blood glucose levels at home due to lack of a glucometer.  - Her hemoglobin A1c is 9.7, indicating an average glucose level of 292.  - She has been unable to obtain medication due to Medicare insurance issues and is not taking metformin due to side effect concerns.  - She uses [employer] for medication costs.       Assessment & Plan  1. Hypertension.  - Blood pressure remains elevated, recent readings 125/88 and 151/90.  - On lisinopril and hydrochlorothiazide.  - Bring all medications to next visit for proper management.  - Fix Medicare Part D to obtain medications.    2. Diabetes mellitus.  - Hemoglobin A1c 9.7, average glucose 292.  - Not taking diabetes medications due to side effect concerns.  - Emphasized medication adherence to prevent complications like kidney failure. Sent prescription for glucometer and supplies. Advised to purchase glucometer and start monitoring blood glucose at home.  - Fix Medicare Part D to obtain medications.      -She has 3 tasks to complete in the next week  1.Obtain the Medicare Part D card  2.Familiarize herself with [employer] and send a test message  3.bring all medication to the next visit in 1 week     Follow-up in 1 week.  1. Type 2 diabetes mellitus with hyperglycemia, without long-term current use 
3

## (undated) DEVICE — TUBING STRYKEFLOW II SUCTION / IRRIGATOR

## (undated) DEVICE — POSITIONER FOAM HEAD CRADLE (PINK)

## (undated) DEVICE — SNARE POLYP SENS SM 13MM 240CM

## (undated) DEVICE — TROCAR COVIDIEN VERSAONE FIXATION CANNULA 5MM

## (undated) DEVICE — ENDOCATCH 10MM SPECIMEN POUCH

## (undated) DEVICE — ELCTR CORD FOOTSWITCH 1PLR LAPSCP 10FT

## (undated) DEVICE — GLV 8 PROTEXIS (WHITE)

## (undated) DEVICE — SPHINCTEROTOME CLEVERCUT WIRE 25MM  2.8MM X 170CM

## (undated) DEVICE — FORCEP RADIAL JAW 4 W NDL 2.4MM 2.8MM 240CM ORANGE DISP

## (undated) DEVICE — LIGASURE MARYLAND 37CM

## (undated) DEVICE — TUBING STRYKER PNEUMOSURE HI FLOW INSUFFLATOR

## (undated) DEVICE — BIOPSY FORCEP RADIAL JAW 4 STANDARD WITH NEEDLE

## (undated) DEVICE — TUBING SUCTION 20FT

## (undated) DEVICE — DVC AUTO CAP RX LOKG

## (undated) DEVICE — TROCAR COVIDIEN VERSAPORT BLADELESS OPTICAL 5MM STANDARD

## (undated) DEVICE — PAPIL BILRTH II 6-5FRX0.035IN

## (undated) DEVICE — SENSOR O2 FINGER ADULT

## (undated) DEVICE — ELCTR GROUNDING PAD ADULT COVIDIEN

## (undated) DEVICE — SNARE POLYP MINI ACCUSNR 1.5 X 3CM

## (undated) DEVICE — NDL INJ SCLERO INTERJECT 23G

## (undated) DEVICE — SOL IRR BAG NS 0.9% 3000ML

## (undated) DEVICE — WARMING BLANKET UPPER ADULT

## (undated) DEVICE — Device

## (undated) DEVICE — VENODYNE/SCD SLEEVE CALF MEDIUM

## (undated) DEVICE — CLIPPER BLADE GENERAL USE

## (undated) DEVICE — LITHOTRIPY BASKET TRAPEZOID 2.5CM

## (undated) DEVICE — TIP METZENBAUM SCISSOR MONOPOLAR ENDOCUT (ORANGE)

## (undated) DEVICE — OLYMPUS DISTAL COVER ENDOSCOPE

## (undated) DEVICE — DRSG DERMABOND 0.7ML

## (undated) DEVICE — D HELP - CLEARVIEW CLEARIFY SYSTEM

## (undated) DEVICE — BITE BLOCK ADULT 20 X 27MM (GREEN)

## (undated) DEVICE — TROCAR COVIDIEN VERSAONE BLUNT TIP HASSAN 12MM

## (undated) DEVICE — PACK IV START WITH CHG

## (undated) DEVICE — SUT MONOCRYL 4-0 18" PS-2

## (undated) DEVICE — NDL INJ SCLERO INTERJECT 25G

## (undated) DEVICE — SOL INJ NS 0.9% 500ML 2 PORT

## (undated) DEVICE — SUT VICRYL 0 27" UR-6

## (undated) DEVICE — BRUSH CYTO RAP EXCHG 3MM

## (undated) DEVICE — CATH IV SAFE BC 20G X 1.16" (PINK)

## (undated) DEVICE — BRUSH COLONOSCOPY CYTOLOGY

## (undated) DEVICE — SUCTION YANKAUER NO CONTROL VENT

## (undated) DEVICE — TROCAR COVIDIEN VERSAPORT BLADELESS OPTICAL 12MM STANDARD

## (undated) DEVICE — TUBING IV SET GRAVITY 3Y 100" MACRO

## (undated) DEVICE — FOLEY HOLDER STATLOCK 2 WAY ADULT

## (undated) DEVICE — CATH IV SAFE BC 22G X 1" (BLUE)

## (undated) DEVICE — POSITIONER FOAM EGG CRATE ULNAR 2PCS (PINK)

## (undated) DEVICE — PACK GENERAL LAPAROSCOPY